# Patient Record
Sex: FEMALE | Race: WHITE | NOT HISPANIC OR LATINO | Employment: OTHER | URBAN - METROPOLITAN AREA
[De-identification: names, ages, dates, MRNs, and addresses within clinical notes are randomized per-mention and may not be internally consistent; named-entity substitution may affect disease eponyms.]

---

## 2017-01-19 RX ORDER — MONTELUKAST SODIUM 10 MG/1
TABLET ORAL
Qty: 30 TABLET | Refills: 0 | Status: SHIPPED | OUTPATIENT
Start: 2017-01-19 | End: 2017-03-07 | Stop reason: SDUPTHER

## 2017-01-19 RX ORDER — ATORVASTATIN CALCIUM 80 MG/1
TABLET, FILM COATED ORAL
Qty: 30 TABLET | Refills: 0 | Status: SHIPPED | OUTPATIENT
Start: 2017-01-19 | End: 2017-03-07 | Stop reason: SDUPTHER

## 2017-01-19 RX ORDER — LEVOTHYROXINE SODIUM 0.05 MG/1
TABLET ORAL
Qty: 30 TABLET | Refills: 0 | Status: SHIPPED | OUTPATIENT
Start: 2017-01-19 | End: 2017-03-07 | Stop reason: SDUPTHER

## 2017-01-23 RX ORDER — LISINOPRIL 20 MG/1
TABLET ORAL
Qty: 90 TABLET | Refills: 0 | OUTPATIENT
Start: 2017-01-23

## 2017-02-21 RX ORDER — LEVOTHYROXINE SODIUM 0.05 MG/1
TABLET ORAL
Qty: 30 TABLET | Refills: 0 | OUTPATIENT
Start: 2017-02-21

## 2017-02-21 RX ORDER — ATORVASTATIN CALCIUM 80 MG/1
TABLET, FILM COATED ORAL
Qty: 30 TABLET | Refills: 0 | OUTPATIENT
Start: 2017-02-21

## 2017-02-21 RX ORDER — MONTELUKAST SODIUM 10 MG/1
TABLET ORAL
Qty: 30 TABLET | Refills: 0 | OUTPATIENT
Start: 2017-02-21

## 2017-03-07 ENCOUNTER — OFFICE VISIT (OUTPATIENT)
Dept: FAMILY MEDICINE CLINIC | Facility: CLINIC | Age: 77
End: 2017-03-07

## 2017-03-07 VITALS
HEART RATE: 77 BPM | DIASTOLIC BLOOD PRESSURE: 60 MMHG | BODY MASS INDEX: 25.58 KG/M2 | OXYGEN SATURATION: 95 % | TEMPERATURE: 98.5 F | HEIGHT: 67 IN | SYSTOLIC BLOOD PRESSURE: 120 MMHG | WEIGHT: 163 LBS

## 2017-03-07 DIAGNOSIS — Z78.0 POSTMENOPAUSAL: ICD-10-CM

## 2017-03-07 DIAGNOSIS — E55.9 VITAMIN D DEFICIENCY: ICD-10-CM

## 2017-03-07 DIAGNOSIS — M19.90 ARTHRITIS: ICD-10-CM

## 2017-03-07 DIAGNOSIS — I10 ESSENTIAL HYPERTENSION: Primary | ICD-10-CM

## 2017-03-07 DIAGNOSIS — E78.00 HYPERCHOLESTEROLEMIA: ICD-10-CM

## 2017-03-07 DIAGNOSIS — J30.9 ATOPIC RHINITIS: ICD-10-CM

## 2017-03-07 PROCEDURE — 99214 OFFICE O/P EST MOD 30 MIN: CPT | Performed by: FAMILY MEDICINE

## 2017-03-07 RX ORDER — LISINOPRIL 20 MG/1
20 TABLET ORAL DAILY
Qty: 90 TABLET | Refills: 1 | Status: SHIPPED | OUTPATIENT
Start: 2017-03-07 | End: 2017-08-17 | Stop reason: SDUPTHER

## 2017-03-07 RX ORDER — LEVOTHYROXINE SODIUM 0.05 MG/1
50 TABLET ORAL DAILY
Qty: 90 TABLET | Refills: 1 | Status: SHIPPED | OUTPATIENT
Start: 2017-03-07 | End: 2017-08-17 | Stop reason: SDUPTHER

## 2017-03-07 RX ORDER — MONTELUKAST SODIUM 10 MG/1
10 TABLET ORAL NIGHTLY
Qty: 90 TABLET | Refills: 1 | Status: SHIPPED | OUTPATIENT
Start: 2017-03-07 | End: 2017-08-17 | Stop reason: SDUPTHER

## 2017-03-07 RX ORDER — ATORVASTATIN CALCIUM 80 MG/1
80 TABLET, FILM COATED ORAL DAILY
Qty: 90 TABLET | Refills: 1 | Status: SHIPPED | OUTPATIENT
Start: 2017-03-07 | End: 2017-08-17 | Stop reason: SDUPTHER

## 2017-03-07 RX ORDER — AZELASTINE HCL 205.5 UG/1
2 SPRAY NASAL 2 TIMES DAILY
Qty: 30 ML | Refills: 5 | Status: SHIPPED | OUTPATIENT
Start: 2017-03-07 | End: 2017-10-09 | Stop reason: SDUPTHER

## 2017-03-07 RX ORDER — FLUTICASONE PROPIONATE 50 MCG
2 SPRAY, SUSPENSION (ML) NASAL DAILY
Qty: 3 EACH | Refills: 3 | Status: SHIPPED | OUTPATIENT
Start: 2017-03-07 | End: 2017-10-09 | Stop reason: SDUPTHER

## 2017-03-07 NOTE — PROGRESS NOTES
Subjective   Joanna Ferris is a 76 y.o. female.   Chief Complaint   Patient presents with   • Hypertension   • Hyperlipidemia   • Allergic Reaction   • Arthritis       History of Present Illness     #1 hypertension- patient is on lisinopril 20 mg a day. She takes it everyday. She reports no side effects. No cough. She adds liitle salt to her diet. She exercises daily for 30-60 minutes. She rides a bike, she does weights and stretching.  She walks her dog twice a day.  She denies chest pain, shortness of breath, LE edema, dizziness, palpitations.      #2 hypercholesterolemia- patient is Lipitor 80 mg a day. She takes it everyday. No muscle aches, no muscle cramps.     #3 allergic rhinitis- patient is on Flonase, Singulair and normal saline drops.  Sometimes she has to use also Astepro.  Symptoms are controlled.  No side effects.  No nosebleeds.     #4 hypothyroidism-on levothyroxine. She takes 50 µg a day. She takes it on empty stomach and does not eat for at least an hour after taking it. TSH 7/216 at 2.9.    #5 arthritis-it is localized in hands and knees.  Patient is under care off rheumatologist.  She is on Cymbalta 60 mg a day.  It controls her symptoms.  It was started in 2015.  She tried prior to that other medications but none of them worked as well as Cymbalta.  Her rheumatologist suggested that she will continue to follow-up with me and see her only on as-needed basis.  Patient does not need refills at this time.    The following portions of the patient's history were reviewed and updated as appropriate: allergies, current medications, past family history, past medical history, past social history, past surgical history and problem list.    Review of Systems   Constitutional: Negative.    HENT: Negative.    Respiratory: Negative.    Cardiovascular: Negative.    Psychiatric/Behavioral: Negative.          Objective   Wt Readings from Last 3 Encounters:   03/07/17 163 lb (73.9 kg)   07/25/16 164 lb (74.4 kg)    12/15/15 161 lb (73 kg)      Vitals:    03/07/17 1216   BP: 120/60   Pulse: 77   Temp: 98.5 °F (36.9 °C)   SpO2: 95%     Temp Readings from Last 3 Encounters:   03/07/17 98.5 °F (36.9 °C)   07/25/16 98.4 °F (36.9 °C)   12/15/15 98.2 °F (36.8 °C)     BP Readings from Last 3 Encounters:   03/07/17 120/60   07/25/16 112/60   12/15/15 120/70     Pulse Readings from Last 3 Encounters:   03/07/17 77   07/25/16 82   12/15/15 76       Physical Exam   Constitutional: She is oriented to person, place, and time. She appears well-developed and well-nourished.   HENT:   Head: Normocephalic and atraumatic.   Right Ear: Tympanic membrane, external ear and ear canal normal.   Left Ear: Tympanic membrane, external ear and ear canal normal.   Nose: Nose normal.   Mouth/Throat: Oropharynx is clear and moist and mucous membranes are normal.   Neck: Neck supple. Carotid bruit is not present. No thyromegaly present.   Cardiovascular: Normal rate, regular rhythm and normal heart sounds.    Pulmonary/Chest: Effort normal and breath sounds normal.   Neurological: She is alert and oriented to person, place, and time.   Skin: Skin is warm, dry and intact.   Psychiatric: She has a normal mood and affect. Her behavior is normal.       Assessment/Plan   Joanna was seen today for hypertension, hyperlipidemia, allergic reaction and arthritis.    Diagnoses and all orders for this visit:    Essential hypertension  -     Basic Metabolic Panel  -     CBC (No Diff); Future  -     Comprehensive Metabolic Panel; Future  -     Lipid Panel With LDL / HDL Ratio; Future  -     Thyroid Cascade Profile; Future  -     Vitamin D 25 Hydroxy; Future    Hypercholesterolemia  -     CBC (No Diff); Future  -     Comprehensive Metabolic Panel; Future  -     Lipid Panel With LDL / HDL Ratio; Future  -     Thyroid Cascade Profile; Future  -     Vitamin D 25 Hydroxy; Future    Atopic rhinitis  -     CBC (No Diff); Future  -     Comprehensive Metabolic Panel; Future  -      Lipid Panel With LDL / HDL Ratio; Future  -     Thyroid Cascade Profile; Future  -     Vitamin D 25 Hydroxy; Future    Arthritis  -     CBC (No Diff); Future  -     Comprehensive Metabolic Panel; Future  -     Lipid Panel With LDL / HDL Ratio; Future  -     Thyroid Cascade Profile; Future  -     Vitamin D 25 Hydroxy; Future    Postmenopausal  -     DEXA Bone Density Axial; Future  -     CBC (No Diff); Future  -     Comprehensive Metabolic Panel; Future  -     Lipid Panel With LDL / HDL Ratio; Future  -     Thyroid Cascade Profile; Future  -     Vitamin D 25 Hydroxy; Future    Vitamin D deficiency  -     Vitamin D 25 Hydroxy; Future    Other orders  -     montelukast (SINGULAIR) 10 MG tablet; Take 1 tablet by mouth Every Night.  -     lisinopril (PRINIVIL,ZESTRIL) 20 MG tablet; Take 1 tablet by mouth Daily.  -     levothyroxine (SYNTHROID, LEVOTHROID) 50 MCG tablet; Take 1 tablet by mouth Daily.  -     fluticasone (FLONASE) 50 MCG/ACT nasal spray; 2 sprays into each nostril Daily.  -     azelastine (ASTEPRO) 0.15 % solution nasal spray; 2 sprays into each nostril 2 (Two) Times a Day.  -     atorvastatin (LIPITOR) 80 MG tablet; Take 1 tablet by mouth Daily.        #1 HTN- controlled.  Continue current treatment.  Check BMP.  Great job with exercise.  Follow-up in 6 months.      #2 hyperlipidemia-continue current treatment.  Follow-up in 6 months.      #3 allergic rhinitis-controlled.  Continue same.  Follow-up in 6 months.    #4 hypothyroidism-continue current treatment.  Will need labs in 6 months.    #5 arthritis-controlled.  No RF needed at this time.    Bone density was done 3 years ago.  We are ordering it.

## 2017-03-08 LAB
BUN SERPL-MCNC: 21 MG/DL (ref 8–27)
BUN/CREAT SERPL: 20 (ref 11–26)
CALCIUM SERPL-MCNC: 9.8 MG/DL (ref 8.7–10.3)
CHLORIDE SERPL-SCNC: 101 MMOL/L (ref 96–106)
CO2 SERPL-SCNC: 24 MMOL/L (ref 18–29)
CREAT SERPL-MCNC: 1.03 MG/DL (ref 0.57–1)
GLUCOSE SERPL-MCNC: 99 MG/DL (ref 65–99)
INTERPRETATION: NORMAL
Lab: NORMAL
POTASSIUM SERPL-SCNC: 4.9 MMOL/L (ref 3.5–5.2)
SODIUM SERPL-SCNC: 143 MMOL/L (ref 134–144)

## 2017-03-20 ENCOUNTER — HOSPITAL ENCOUNTER (OUTPATIENT)
Dept: BONE DENSITY | Facility: HOSPITAL | Age: 77
Discharge: HOME | End: 2017-03-20
Admitting: FAMILY MEDICINE

## 2017-03-20 DIAGNOSIS — Z78.0 POSTMENOPAUSAL: ICD-10-CM

## 2017-03-20 PROCEDURE — 77080 DXA BONE DENSITY AXIAL: CPT

## 2017-08-18 RX ORDER — ATORVASTATIN CALCIUM 80 MG/1
TABLET, FILM COATED ORAL
Qty: 30 TABLET | Refills: 0 | Status: SHIPPED | OUTPATIENT
Start: 2017-08-18 | End: 2017-10-09 | Stop reason: SDUPTHER

## 2017-08-18 RX ORDER — LISINOPRIL 20 MG/1
TABLET ORAL
Qty: 30 TABLET | Refills: 0 | Status: SHIPPED | OUTPATIENT
Start: 2017-08-18 | End: 2017-10-04 | Stop reason: SDUPTHER

## 2017-08-18 RX ORDER — FLUTICASONE PROPIONATE 50 MCG
SPRAY, SUSPENSION (ML) NASAL
Qty: 48 ML | Refills: 0 | Status: SHIPPED | OUTPATIENT
Start: 2017-08-18 | End: 2017-10-09 | Stop reason: SDUPTHER

## 2017-08-18 RX ORDER — AZELASTINE HCL 205.5 UG/1
SPRAY NASAL
Qty: 30 ML | Refills: 0 | Status: SHIPPED | OUTPATIENT
Start: 2017-08-18 | End: 2017-10-09 | Stop reason: SDUPTHER

## 2017-08-18 RX ORDER — LEVOTHYROXINE SODIUM 0.05 MG/1
TABLET ORAL
Qty: 30 TABLET | Refills: 0 | Status: SHIPPED | OUTPATIENT
Start: 2017-08-18 | End: 2017-10-09 | Stop reason: SDUPTHER

## 2017-08-18 RX ORDER — MONTELUKAST SODIUM 10 MG/1
TABLET ORAL
Qty: 30 TABLET | Refills: 0 | Status: SHIPPED | OUTPATIENT
Start: 2017-08-18 | End: 2017-10-09 | Stop reason: SDUPTHER

## 2017-09-07 ENCOUNTER — RESULTS ENCOUNTER (OUTPATIENT)
Dept: FAMILY MEDICINE CLINIC | Facility: CLINIC | Age: 77
End: 2017-09-07

## 2017-09-07 DIAGNOSIS — M19.90 ARTHRITIS: ICD-10-CM

## 2017-09-07 DIAGNOSIS — E78.00 HYPERCHOLESTEROLEMIA: ICD-10-CM

## 2017-09-07 DIAGNOSIS — Z78.0 POSTMENOPAUSAL: ICD-10-CM

## 2017-09-07 DIAGNOSIS — J30.9 ATOPIC RHINITIS: ICD-10-CM

## 2017-09-07 DIAGNOSIS — E55.9 VITAMIN D DEFICIENCY: ICD-10-CM

## 2017-09-07 DIAGNOSIS — I10 ESSENTIAL HYPERTENSION: ICD-10-CM

## 2017-10-04 RX ORDER — LISINOPRIL 20 MG/1
TABLET ORAL
Qty: 30 TABLET | Refills: 0 | Status: SHIPPED | OUTPATIENT
Start: 2017-10-04 | End: 2017-10-09 | Stop reason: SDUPTHER

## 2017-10-09 ENCOUNTER — OFFICE VISIT (OUTPATIENT)
Dept: INTERNAL MEDICINE | Facility: CLINIC | Age: 77
End: 2017-10-09

## 2017-10-09 VITALS
OXYGEN SATURATION: 98 % | DIASTOLIC BLOOD PRESSURE: 70 MMHG | BODY MASS INDEX: 25.43 KG/M2 | HEIGHT: 67 IN | WEIGHT: 162 LBS | SYSTOLIC BLOOD PRESSURE: 120 MMHG | TEMPERATURE: 98.2 F | HEART RATE: 75 BPM

## 2017-10-09 DIAGNOSIS — J30.89 CHRONIC NONSEASONAL ALLERGIC RHINITIS DUE TO POLLEN: ICD-10-CM

## 2017-10-09 DIAGNOSIS — E78.00 HYPERCHOLESTEROLEMIA: ICD-10-CM

## 2017-10-09 DIAGNOSIS — I10 ESSENTIAL HYPERTENSION: Primary | ICD-10-CM

## 2017-10-09 DIAGNOSIS — E03.9 ACQUIRED HYPOTHYROIDISM: ICD-10-CM

## 2017-10-09 PROCEDURE — 99214 OFFICE O/P EST MOD 30 MIN: CPT | Performed by: FAMILY MEDICINE

## 2017-10-09 RX ORDER — AZELASTINE HCL 205.5 UG/1
1 SPRAY NASAL 2 TIMES DAILY
Qty: 30 ML | Refills: 5 | Status: SHIPPED | OUTPATIENT
Start: 2017-10-09 | End: 2020-09-08 | Stop reason: SDUPTHER

## 2017-10-09 RX ORDER — LISINOPRIL 20 MG/1
20 TABLET ORAL DAILY
Qty: 90 TABLET | Refills: 1 | Status: SHIPPED | OUTPATIENT
Start: 2017-10-09 | End: 2018-03-10 | Stop reason: SDUPTHER

## 2017-10-09 RX ORDER — ATORVASTATIN CALCIUM 80 MG/1
80 TABLET, FILM COATED ORAL NIGHTLY
Qty: 90 TABLET | Refills: 1 | Status: SHIPPED | OUTPATIENT
Start: 2017-10-09 | End: 2018-04-05 | Stop reason: SDUPTHER

## 2017-10-09 RX ORDER — MONTELUKAST SODIUM 10 MG/1
10 TABLET ORAL NIGHTLY
Qty: 90 TABLET | Refills: 1 | Status: SHIPPED | OUTPATIENT
Start: 2017-10-09 | End: 2018-04-05 | Stop reason: SDUPTHER

## 2017-10-09 RX ORDER — FLUTICASONE PROPIONATE 50 MCG
2 SPRAY, SUSPENSION (ML) NASAL DAILY
Qty: 48 ML | Refills: 1 | Status: SHIPPED | OUTPATIENT
Start: 2017-10-09 | End: 2018-10-17 | Stop reason: SDUPTHER

## 2017-10-09 RX ORDER — LEVOTHYROXINE SODIUM 0.05 MG/1
50 TABLET ORAL DAILY
Qty: 90 TABLET | Refills: 3 | Status: SHIPPED | OUTPATIENT
Start: 2017-10-09 | End: 2018-09-30 | Stop reason: SDUPTHER

## 2017-10-09 NOTE — PROGRESS NOTES
Subjective   Joanna Ferris is a 76 y.o. female.   Chief Complaint   Patient presents with   • Hypothyroidism   • Allergic Rhinitis   • Hyperlipidemia   • Hypertension       History of Present Illness     #1 hypertension- patient is on lisinopril 20 mg a day. She takes it everyday. She reports no side effects. No cough. She adds liitle salt to her diet. She exercises daily- She walks her dog for 20-30 minutes and she rides a bike for 30 minutes a day.   She denies chest pain, shortness of breath, LE edema, dizziness, palpitations.       #2 hypercholesterolemia- patient is Lipitor 80 mg a day. She takes it everyday. No muscle aches, no muscle cramps.      #3 allergic rhinitis- patient is on Flonase, Singulair and normal saline drops.  Sometimes she has to use also Astepro.  Symptoms are controlled.  No side effects.  No nosebleeds.  She has symptoms all year long.     #4 hypothyroidism-on levothyroxine. She takes 50 µg a day. She takes it on empty stomach and does not eat for at least an hour after taking it.      #5 arthritis/anxiety-it is localized in hands and knees.  Patient is under care off rheumatologist.  She is on Cymbalta 60 mg a day- It helps with anxiety and arthritis.  It controls her symptoms.  It was started in 2015.  She tried prior to that other medications but none of them worked as well as Cymbalta.  Her rheumatologist suggested that she will continue to follow-up with me and see her only on as-needed basis.  Patient does not need refills at this time.    The following portions of the patient's history were reviewed and updated as appropriate: allergies, current medications, past family history, past medical history, past social history, past surgical history and problem list.    Review of Systems   Constitutional: Negative.    Respiratory: Negative.    Cardiovascular: Negative.    Gastrointestinal: Negative.    Psychiatric/Behavioral: Negative.          Objective   Wt Readings from Last 3  Encounters:   10/09/17 162 lb (73.5 kg)   03/07/17 163 lb (73.9 kg)   07/25/16 164 lb (74.4 kg)      Vitals:    10/09/17 1254   BP: 120/70   Pulse: 75   Temp: 98.2 °F (36.8 °C)   SpO2: 98%     Temp Readings from Last 3 Encounters:   10/09/17 98.2 °F (36.8 °C)   03/07/17 98.5 °F (36.9 °C)   07/25/16 98.4 °F (36.9 °C)     BP Readings from Last 3 Encounters:   10/09/17 120/70   03/07/17 120/60   07/25/16 112/60     Pulse Readings from Last 3 Encounters:   10/09/17 75   03/07/17 77   07/25/16 82       Physical Exam   Constitutional: She is oriented to person, place, and time. She appears well-developed and well-nourished.   HENT:   Head: Normocephalic and atraumatic.   Neck: Neck supple. Carotid bruit is not present. No thyromegaly present.   Cardiovascular: Normal rate, regular rhythm and normal heart sounds.    Pulmonary/Chest: Effort normal and breath sounds normal.   Neurological: She is alert and oriented to person, place, and time.   Skin: Skin is warm, dry and intact.   Psychiatric: She has a normal mood and affect. Her behavior is normal.       Assessment/Plan   Joanna was seen today for hypothyroidism, allergic rhinitis, hyperlipidemia and hypertension.    Diagnoses and all orders for this visit:    Essential hypertension    Hypercholesterolemia    Chronic nonseasonal allergic rhinitis due to pollen    Acquired hypothyroidism    Other orders  -     montelukast (SINGULAIR) 10 MG tablet; Take 1 tablet by mouth Every Night.  -     lisinopril (PRINIVIL,ZESTRIL) 20 MG tablet; Take 1 tablet by mouth Daily.  -     levothyroxine (SYNTHROID, LEVOTHROID) 50 MCG tablet; Take 1 tablet by mouth Daily.  -     fluticasone (FLONASE) 50 MCG/ACT nasal spray; 2 sprays into each nostril Daily.  -     azelastine (ASTEPRO) 0.15 % solution nasal spray; 1 spray into each nostril 2 (Two) Times a Day.  -     atorvastatin (LIPITOR) 80 MG tablet; Take 1 tablet by mouth Every Night.        #1 hypertension-controlled.  Continue current  treatment.  Follow-up in 6 months.    #2 hyperlipidemia-check labs.  Continue current treatment.  Follow-up in 6 months.    #3 allergic rhinitis-controlled.  Continue same.  Follow-up in 6-12 months.    #4 hypothyroidism-check labs today.  Follow-up in 12 months.      #5 anxiety/osteoarthritis-no prescription needed at this time.  Follow-up in 6 months.  If patient needs refill sooner she will call us.

## 2017-10-10 LAB
25(OH)D3+25(OH)D2 SERPL-MCNC: 47.5 NG/ML (ref 30–100)
ALBUMIN SERPL-MCNC: 4.7 G/DL (ref 3.5–5.2)
ALBUMIN/GLOB SERPL: 2 G/DL
ALP SERPL-CCNC: 72 U/L (ref 39–117)
ALT SERPL-CCNC: 23 U/L (ref 1–33)
AST SERPL-CCNC: 25 U/L (ref 1–32)
BILIRUB SERPL-MCNC: 0.7 MG/DL (ref 0.1–1.2)
BUN SERPL-MCNC: 20 MG/DL (ref 8–23)
BUN/CREAT SERPL: 16.9 (ref 7–25)
CALCIUM SERPL-MCNC: 10.4 MG/DL (ref 8.6–10.5)
CHLORIDE SERPL-SCNC: 99 MMOL/L (ref 98–107)
CHOLEST SERPL-MCNC: 219 MG/DL (ref 0–200)
CO2 SERPL-SCNC: 24.9 MMOL/L (ref 22–29)
CREAT SERPL-MCNC: 1.18 MG/DL (ref 0.57–1)
ERYTHROCYTE [DISTWIDTH] IN BLOOD BY AUTOMATED COUNT: 13.7 % (ref 11.7–13)
GLOBULIN SER CALC-MCNC: 2.4 GM/DL
GLUCOSE SERPL-MCNC: 99 MG/DL (ref 65–99)
HCT VFR BLD AUTO: 45.5 % (ref 35.6–45.5)
HDLC SERPL-MCNC: 85 MG/DL (ref 40–60)
HGB BLD-MCNC: 15.2 G/DL (ref 11.9–15.5)
LDLC SERPL CALC-MCNC: 114 MG/DL (ref 0–100)
LDLC/HDLC SERPL: 1.34 {RATIO}
MCH RBC QN AUTO: 32.5 PG (ref 26.9–32)
MCHC RBC AUTO-ENTMCNC: 33.4 G/DL (ref 32.4–36.3)
MCV RBC AUTO: 97.2 FL (ref 80.5–98.2)
PLATELET # BLD AUTO: 279 10*3/MM3 (ref 140–500)
POTASSIUM SERPL-SCNC: 4.7 MMOL/L (ref 3.5–5.2)
PROT SERPL-MCNC: 7.1 G/DL (ref 6–8.5)
RBC # BLD AUTO: 4.68 10*6/MM3 (ref 3.9–5.2)
SODIUM SERPL-SCNC: 141 MMOL/L (ref 136–145)
TRIGL SERPL-MCNC: 99 MG/DL (ref 0–150)
TSH SERPL DL<=0.005 MIU/L-ACNC: 3.03 UIU/ML (ref 0.45–4.5)
VLDLC SERPL CALC-MCNC: 19.8 MG/DL (ref 5–40)
WBC # BLD AUTO: 8.58 10*3/MM3 (ref 4.5–10.7)

## 2017-11-09 ENCOUNTER — OFFICE VISIT (OUTPATIENT)
Dept: OBSTETRICS AND GYNECOLOGY | Facility: CLINIC | Age: 77
End: 2017-11-09

## 2017-11-09 ENCOUNTER — PROCEDURE VISIT (OUTPATIENT)
Dept: OBSTETRICS AND GYNECOLOGY | Facility: CLINIC | Age: 77
End: 2017-11-09

## 2017-11-09 VITALS
HEIGHT: 67 IN | WEIGHT: 159 LBS | DIASTOLIC BLOOD PRESSURE: 80 MMHG | BODY MASS INDEX: 24.96 KG/M2 | SYSTOLIC BLOOD PRESSURE: 140 MMHG

## 2017-11-09 DIAGNOSIS — D25.9 UTERINE LEIOMYOMA, UNSPECIFIED LOCATION: Primary | ICD-10-CM

## 2017-11-09 DIAGNOSIS — R14.0 ABDOMINAL BLOATING: Primary | ICD-10-CM

## 2017-11-09 DIAGNOSIS — R14.0 ABDOMINAL BLOATING: ICD-10-CM

## 2017-11-09 PROCEDURE — 76830 TRANSVAGINAL US NON-OB: CPT | Performed by: NURSE PRACTITIONER

## 2017-11-09 PROCEDURE — 99213 OFFICE O/P EST LOW 20 MIN: CPT | Performed by: NURSE PRACTITIONER

## 2017-11-09 NOTE — PROGRESS NOTES
Joanna Ferris is a 76 y.o. female.   Chief Complaint   Patient presents with   • Follow-up     Patient is here for fibriod recheck.      HPI:pt c/o abdominal bloating  And would like her fibroid rechecked    The following portions of the patient's history were reviewed and updated as appropriate: allergies, current medications, past family history, past medical history, past social history, past surgical history and problem list.    Review of Systems  Review of Systems   Constitutional:        Abdominal bloating   Genitourinary: Negative.    Allergic/Immunologic: Negative.    Psychiatric/Behavioral: Negative.        Objective   Physical Exam   Constitutional: She is oriented to person, place, and time. She appears well-developed and well-nourished.   Neurological: She is alert and oriented to person, place, and time.   Skin: Skin is warm and dry.   Psychiatric: She has a normal mood and affect. Her behavior is normal.   Nursing note and vitals reviewed.      Assessment/Plan   There are no Patient Instructions on file for this visit.    Joanna was seen today for follow-up.    Diagnoses and all orders for this visit:    Abdominal bloating        No Follow-up on file.

## 2017-11-09 NOTE — PATIENT INSTRUCTIONS
U/s showed calcified fibroid , ovaries wnl.. Total time 15 min  Face to face reviewing the u/s 10 min

## 2018-03-12 RX ORDER — LISINOPRIL 20 MG/1
20 TABLET ORAL DAILY
Qty: 90 TABLET | Refills: 0 | Status: SHIPPED | OUTPATIENT
Start: 2018-03-12 | End: 2018-04-09 | Stop reason: SDUPTHER

## 2018-03-16 RX ORDER — FLUTICASONE PROPIONATE 50 MCG
SPRAY, SUSPENSION (ML) NASAL
Qty: 48 ML | Refills: 0 | Status: SHIPPED | OUTPATIENT
Start: 2018-03-16 | End: 2018-04-09 | Stop reason: SDUPTHER

## 2018-04-05 RX ORDER — MONTELUKAST SODIUM 10 MG/1
10 TABLET ORAL NIGHTLY
Qty: 90 TABLET | Refills: 0 | Status: SHIPPED | OUTPATIENT
Start: 2018-04-05 | End: 2018-07-02 | Stop reason: SDUPTHER

## 2018-04-05 RX ORDER — ATORVASTATIN CALCIUM 80 MG/1
80 TABLET, FILM COATED ORAL NIGHTLY
Qty: 90 TABLET | Refills: 0 | Status: SHIPPED | OUTPATIENT
Start: 2018-04-05 | End: 2018-04-09 | Stop reason: SDUPTHER

## 2018-04-09 ENCOUNTER — OFFICE VISIT (OUTPATIENT)
Dept: INTERNAL MEDICINE | Facility: CLINIC | Age: 78
End: 2018-04-09

## 2018-04-09 VITALS
TEMPERATURE: 98.2 F | DIASTOLIC BLOOD PRESSURE: 62 MMHG | OXYGEN SATURATION: 98 % | HEIGHT: 66 IN | SYSTOLIC BLOOD PRESSURE: 104 MMHG | HEART RATE: 78 BPM | WEIGHT: 155 LBS | BODY MASS INDEX: 24.91 KG/M2

## 2018-04-09 DIAGNOSIS — I10 ESSENTIAL HYPERTENSION: ICD-10-CM

## 2018-04-09 DIAGNOSIS — N18.30 CKD (CHRONIC KIDNEY DISEASE) STAGE 3, GFR 30-59 ML/MIN (HCC): ICD-10-CM

## 2018-04-09 DIAGNOSIS — I10 ESSENTIAL HYPERTENSION: Primary | ICD-10-CM

## 2018-04-09 DIAGNOSIS — E78.00 HYPERCHOLESTEROLEMIA: ICD-10-CM

## 2018-04-09 DIAGNOSIS — M19.90 ARTHRITIS: ICD-10-CM

## 2018-04-09 DIAGNOSIS — F41.1 GENERALIZED ANXIETY DISORDER: ICD-10-CM

## 2018-04-09 PROCEDURE — 99214 OFFICE O/P EST MOD 30 MIN: CPT | Performed by: FAMILY MEDICINE

## 2018-04-09 RX ORDER — LISINOPRIL 10 MG/1
10 TABLET ORAL DAILY
Qty: 90 TABLET | Refills: 1 | Status: SHIPPED | OUTPATIENT
Start: 2018-04-09 | End: 2018-12-18 | Stop reason: SDUPTHER

## 2018-04-09 RX ORDER — ATORVASTATIN CALCIUM 80 MG/1
80 TABLET, FILM COATED ORAL NIGHTLY
Qty: 90 TABLET | Refills: 1 | Status: SHIPPED | OUTPATIENT
Start: 2018-04-09 | End: 2018-12-19 | Stop reason: SDUPTHER

## 2018-04-09 NOTE — PROGRESS NOTES
Subjective   Joanna Ferris is a 77 y.o. female.   Chief Complaint   Patient presents with   • Hypotension   • Dizziness   • Hyperlipidemia   • Chronic Kidney Disease       History of Present Illness     #1 hypertension- patient is on lisinopril 20 mg a day. She takes it everyday. Last 2-3 months she noticed dizziness when she stands up.  She improved her diet and she lost 8 pounds.  She adds liitle salt to her diet. She exercises daily- She walks her dog for 20-30 minutes. She denies chest pain, shortness of breath, LE edema, palpitations.       #2 hypercholesterolemia- patient is Lipitor 80 mg a day. She takes it everyday. No muscle aches, no muscle cramps.      #3 arthritis/anxiety-it is localized in hands and knees.  Patient is under care off rheumatologist.  She is on Cymbalta 60 mg a day- It helps with anxiety and arthritis.  It controls her symptoms.  It was started in 2015.  She tried prior to that other medications but none of them worked as well as Cymbalta.  Her rheumatologist suggested that she will continue to follow-up with me and see her only on as-needed basis.  Patient does not need refills at this time.    #4 chronic kidney failure-patient does not use any NSAIDs or PPIs.  No urinary symptoms.       The following portions of the patient's history were reviewed and updated as appropriate: allergies, current medications, past family history, past medical history, past social history, past surgical history and problem list.    Review of Systems   Constitutional: Negative.    Respiratory: Negative.    Cardiovascular: Negative.    Neurological: Positive for light-headedness.         Objective   Wt Readings from Last 3 Encounters:   04/09/18 70.3 kg (155 lb)   11/09/17 72.1 kg (159 lb)   10/09/17 73.5 kg (162 lb)      Vitals:    04/09/18 1417   BP: 104/62   Pulse: 78   Temp: 98.2 °F (36.8 °C)   SpO2: 98%     Temp Readings from Last 3 Encounters:   04/09/18 98.2 °F (36.8 °C)   10/09/17 98.2 °F (36.8 °C)    03/07/17 98.5 °F (36.9 °C)     BP Readings from Last 3 Encounters:   04/09/18 104/62   11/09/17 140/80   10/09/17 120/70     Pulse Readings from Last 3 Encounters:   04/09/18 78   10/09/17 75   03/07/17 77       Physical Exam   Constitutional: She is oriented to person, place, and time. She appears well-developed and well-nourished.   HENT:   Head: Normocephalic and atraumatic.   Neck: Neck supple. Carotid bruit is not present. No thyromegaly present.   Cardiovascular: Normal rate, regular rhythm and normal heart sounds.    Pulmonary/Chest: Effort normal and breath sounds normal.   Neurological: She is alert and oriented to person, place, and time.   Skin: Skin is warm, dry and intact.   Psychiatric: She has a normal mood and affect. Her behavior is normal.       Assessment/Plan   Joanna was seen today for hypotension, dizziness, hyperlipidemia and chronic kidney disease.    Diagnoses and all orders for this visit:    Essential hypertension  -     Basic Metabolic Panel; Future    Hypercholesterolemia    CKD (chronic kidney disease) stage 3, GFR 30-59 ml/min    Arthritis    Generalized anxiety disorder    Other orders  -     lisinopril (PRINIVIL,ZESTRIL) 10 MG tablet; Take 1 tablet by mouth Daily.  -     atorvastatin (LIPITOR) 80 MG tablet; Take 1 tablet by mouth Every Night.        #1 HTN-symptoms of low blood pressure.  Can be secondary to weight loss.  We are decreasing dose of lisinopril to 10 mg a day.  Follow-up in one month or sooner if problems.    #2 hyperlipidemia-continue current treatment.  Follow-up in 6 months.    #3 chronic kidney failure-check labs.  Continue to avoid NSAIDs and PPIs.  Follow-up in 6 months.    #4 arthritis/anxiety-patient does not need refills of Cymbalta at this time.  She will call us when it's needed.  Follow-up in 6 months.

## 2018-04-10 LAB
BUN SERPL-MCNC: 22 MG/DL (ref 8–23)
BUN/CREAT SERPL: 20.4 (ref 7–25)
CALCIUM SERPL-MCNC: 10.2 MG/DL (ref 8.6–10.5)
CHLORIDE SERPL-SCNC: 100 MMOL/L (ref 98–107)
CO2 SERPL-SCNC: 27.7 MMOL/L (ref 22–29)
CREAT SERPL-MCNC: 1.08 MG/DL (ref 0.57–1)
GFR SERPLBLD CREATININE-BSD FMLA CKD-EPI: 49 ML/MIN/1.73
GFR SERPLBLD CREATININE-BSD FMLA CKD-EPI: 60 ML/MIN/1.73
GLUCOSE SERPL-MCNC: 90 MG/DL (ref 65–99)
POTASSIUM SERPL-SCNC: 4.7 MMOL/L (ref 3.5–5.2)
SODIUM SERPL-SCNC: 140 MMOL/L (ref 136–145)

## 2018-05-21 ENCOUNTER — OFFICE VISIT (OUTPATIENT)
Dept: INTERNAL MEDICINE | Facility: CLINIC | Age: 78
End: 2018-05-21

## 2018-05-21 VITALS
SYSTOLIC BLOOD PRESSURE: 118 MMHG | HEIGHT: 66 IN | DIASTOLIC BLOOD PRESSURE: 68 MMHG | TEMPERATURE: 98 F | OXYGEN SATURATION: 98 % | BODY MASS INDEX: 24.75 KG/M2 | HEART RATE: 75 BPM | WEIGHT: 154 LBS

## 2018-05-21 DIAGNOSIS — I10 ESSENTIAL HYPERTENSION: Primary | ICD-10-CM

## 2018-05-21 PROCEDURE — 99213 OFFICE O/P EST LOW 20 MIN: CPT | Performed by: FAMILY MEDICINE

## 2018-05-21 NOTE — PROGRESS NOTES
Subjective   Joanna Ferris is a 77 y.o. female.   Chief Complaint   Patient presents with   • Hypertension       History of Present Illness     #1 HTN- at last visit we reduced dose of lisinopril to 10 mg a day due to dizziness.  Patient did not have any problems with dizziness since then.  Blood pressure is 118/68.  She has no chest pain, no shortness of breath, no palpitations.  She feels good.  She exercises regularly.    The following portions of the patient's history were reviewed and updated as appropriate: allergies, current medications, past medical history, past social history and problem list.    Review of Systems   Constitutional: Negative.    Respiratory: Negative.    Cardiovascular: Negative.    Neurological: Negative for dizziness.         Objective   Wt Readings from Last 3 Encounters:   05/21/18 69.9 kg (154 lb)   04/09/18 70.3 kg (155 lb)   11/09/17 72.1 kg (159 lb)      Vitals:    05/21/18 1246   BP: 118/68   Pulse: 75   Temp: 98 °F (36.7 °C)   SpO2: 98%     Temp Readings from Last 3 Encounters:   05/21/18 98 °F (36.7 °C)   04/09/18 98.2 °F (36.8 °C)   10/09/17 98.2 °F (36.8 °C)     BP Readings from Last 3 Encounters:   05/21/18 118/68   04/09/18 104/62   11/09/17 140/80     Pulse Readings from Last 3 Encounters:   05/21/18 75   04/09/18 78   10/09/17 75       Physical Exam   Constitutional: She is oriented to person, place, and time. She appears well-developed and well-nourished.   HENT:   Head: Normocephalic and atraumatic.   Neck: Neck supple. Carotid bruit is not present. No thyromegaly present.   Cardiovascular: Normal rate, regular rhythm and normal heart sounds.    Pulmonary/Chest: Effort normal and breath sounds normal.   Neurological: She is alert and oriented to person, place, and time.   Skin: Skin is warm, dry and intact.   Psychiatric: She has a normal mood and affect. Her behavior is normal.       Assessment/Plan   Joanna was seen today for hypertension.    Diagnoses and all orders  for this visit:    Essential hypertension        #1 hypertension-controlled.  Continue current treatment.  Follow-up in 6 months.

## 2018-06-20 RX ORDER — DULOXETIN HYDROCHLORIDE 60 MG/1
60 CAPSULE, DELAYED RELEASE ORAL DAILY
Qty: 90 CAPSULE | Refills: 1 | Status: SHIPPED | OUTPATIENT
Start: 2018-06-20 | End: 2018-12-16 | Stop reason: SDUPTHER

## 2018-07-02 RX ORDER — MONTELUKAST SODIUM 10 MG/1
10 TABLET ORAL NIGHTLY
Qty: 90 TABLET | Refills: 0 | Status: SHIPPED | OUTPATIENT
Start: 2018-07-02 | End: 2018-09-30 | Stop reason: SDUPTHER

## 2018-07-02 RX ORDER — ATORVASTATIN CALCIUM 80 MG/1
80 TABLET, FILM COATED ORAL NIGHTLY
Qty: 90 TABLET | Refills: 0 | Status: SHIPPED | OUTPATIENT
Start: 2018-07-02 | End: 2018-11-10 | Stop reason: HOSPADM

## 2018-07-20 RX ORDER — FLUTICASONE PROPIONATE 50 MCG
SPRAY, SUSPENSION (ML) NASAL
Qty: 48 ML | Refills: 0 | Status: SHIPPED | OUTPATIENT
Start: 2018-07-20 | End: 2018-10-17 | Stop reason: SDUPTHER

## 2018-09-13 ENCOUNTER — OFFICE VISIT (OUTPATIENT)
Dept: INTERNAL MEDICINE | Facility: CLINIC | Age: 78
End: 2018-09-13

## 2018-09-13 VITALS
WEIGHT: 156.38 LBS | TEMPERATURE: 97.9 F | HEIGHT: 67 IN | HEART RATE: 76 BPM | SYSTOLIC BLOOD PRESSURE: 122 MMHG | BODY MASS INDEX: 24.54 KG/M2 | OXYGEN SATURATION: 97 % | DIASTOLIC BLOOD PRESSURE: 68 MMHG

## 2018-09-13 DIAGNOSIS — J01.00 ACUTE NON-RECURRENT MAXILLARY SINUSITIS: Primary | ICD-10-CM

## 2018-09-13 DIAGNOSIS — R05.9 COUGH: ICD-10-CM

## 2018-09-13 PROCEDURE — 99213 OFFICE O/P EST LOW 20 MIN: CPT | Performed by: NURSE PRACTITIONER

## 2018-09-13 RX ORDER — BENZONATATE 100 MG/1
100 CAPSULE ORAL 3 TIMES DAILY PRN
Qty: 30 CAPSULE | Refills: 0 | Status: SHIPPED | OUTPATIENT
Start: 2018-09-13 | End: 2019-08-26

## 2018-09-13 RX ORDER — AMOXICILLIN 875 MG/1
875 TABLET, COATED ORAL 2 TIMES DAILY
Qty: 20 TABLET | Refills: 0 | Status: SHIPPED | OUTPATIENT
Start: 2018-09-13 | End: 2018-09-24

## 2018-09-13 NOTE — PROGRESS NOTES
Subjective   Joanna Ferris is a 77 y.o. female. Patient is here today for   Chief Complaint   Patient presents with   • URI     Pt complains of having bilateral ear pain, nasal congestion & productive cough x2 weeks. Pt has been taking Mucinex OTC.    .    History of Present Illness   C/o cough x 2 weeks associated with bilateral ear pain, sore throat, post-nasal drainage, facial pain.   She takes singulair and flonase daily and has tried mucinex. The cough is worse at night.   She has also tried netti-pot and humidifier with little relief.     The following portions of the patient's history were reviewed and updated as appropriate: allergies, current medications, past family history, past medical history, past social history, past surgical history and problem list.    Review of Systems   Constitutional: Positive for fatigue.   HENT: Positive for congestion, postnasal drip, sinus pain, sinus pressure and sore throat.    Respiratory: Positive for cough. Negative for shortness of breath and wheezing.    Cardiovascular: Negative.    Gastrointestinal: Negative.    Psychiatric/Behavioral: Negative.        Objective   Vitals:    09/13/18 1123   BP: 122/68   Pulse: 76   Temp: 97.9 °F (36.6 °C)   SpO2: 97%     Physical Exam   Constitutional: Vital signs are normal. She appears well-developed and well-nourished.   HENT:   Right Ear: Tympanic membrane is erythematous.   Left Ear: Tympanic membrane is erythematous.   Cardiovascular: Normal rate, regular rhythm and normal heart sounds.    Pulmonary/Chest: Effort normal and breath sounds normal.   Skin: Skin is warm, dry and intact.   Psychiatric: She has a normal mood and affect. Her speech is normal and behavior is normal. Thought content normal.   Nursing note and vitals reviewed.      Assessment/Plan   Joanna was seen today for uri.    Diagnoses and all orders for this visit:    Acute non-recurrent maxillary sinusitis  -     amoxicillin (AMOXIL) 875 MG tablet; Take 1 tablet  by mouth 2 (Two) Times a Day.    Cough  -     benzonatate (TESSALON PERLES) 100 MG capsule; Take 1 capsule by mouth 3 (Three) Times a Day As Needed for Cough.

## 2018-09-24 ENCOUNTER — TELEPHONE (OUTPATIENT)
Dept: INTERNAL MEDICINE | Facility: CLINIC | Age: 78
End: 2018-09-24

## 2018-09-24 RX ORDER — AMOXICILLIN AND CLAVULANATE POTASSIUM 875; 125 MG/1; MG/1
1 TABLET, FILM COATED ORAL 2 TIMES DAILY
Qty: 20 TABLET | Refills: 0 | Status: SHIPPED | OUTPATIENT
Start: 2018-09-24 | End: 2018-11-10 | Stop reason: HOSPADM

## 2018-09-24 NOTE — TELEPHONE ENCOUNTER
----- Message from Amanda Garza MA sent at 9/24/2018  1:10 PM EDT -----  Please advise.     ----- Message -----  From: Malou Buckner  Sent: 9/24/2018   1:00 PM  To: Amanda Garza MA    Pt saw Olive on 9/13 for a sinus infection. Was given Amoxicillin and now it has settled in her eye and ear. Need another antibiotic?  Phone: [phone]

## 2018-10-01 RX ORDER — MONTELUKAST SODIUM 10 MG/1
10 TABLET ORAL NIGHTLY
Qty: 90 TABLET | Refills: 0 | Status: SHIPPED | OUTPATIENT
Start: 2018-10-01 | End: 2019-01-20 | Stop reason: SDUPTHER

## 2018-10-01 RX ORDER — LEVOTHYROXINE SODIUM 0.05 MG/1
50 TABLET ORAL DAILY
Qty: 90 TABLET | Refills: 0 | Status: SHIPPED | OUTPATIENT
Start: 2018-10-01 | End: 2019-01-05 | Stop reason: SDUPTHER

## 2018-10-17 RX ORDER — FLUTICASONE PROPIONATE 50 MCG
2 SPRAY, SUSPENSION (ML) NASAL DAILY
Qty: 48 ML | Refills: 1 | Status: SHIPPED | OUTPATIENT
Start: 2018-10-17 | End: 2019-04-19 | Stop reason: SDUPTHER

## 2018-11-09 ENCOUNTER — HOSPITAL ENCOUNTER (OUTPATIENT)
Facility: HOSPITAL | Age: 78
Setting detail: OBSERVATION
Discharge: HOME | End: 2018-11-10
Attending: EMERGENCY MEDICINE | Admitting: EMERGENCY MEDICINE

## 2018-11-09 ENCOUNTER — APPOINTMENT (OUTPATIENT)
Dept: CARDIOLOGY | Facility: HOSPITAL | Age: 78
End: 2018-11-09
Attending: PSYCHIATRY & NEUROLOGY

## 2018-11-09 ENCOUNTER — APPOINTMENT (OUTPATIENT)
Dept: MRI IMAGING | Facility: HOSPITAL | Age: 78
End: 2018-11-09

## 2018-11-09 ENCOUNTER — APPOINTMENT (OUTPATIENT)
Dept: CT IMAGING | Facility: HOSPITAL | Age: 78
End: 2018-11-09

## 2018-11-09 DIAGNOSIS — G45.4 TGA (TRANSIENT GLOBAL AMNESIA): Primary | ICD-10-CM

## 2018-11-09 LAB
ALBUMIN SERPL-MCNC: 4.3 G/DL (ref 3.5–5.2)
ALBUMIN/GLOB SERPL: 1.4 G/DL
ALP SERPL-CCNC: 75 U/L (ref 39–117)
ALT SERPL W P-5'-P-CCNC: 28 U/L (ref 1–33)
ANION GAP SERPL CALCULATED.3IONS-SCNC: 13.3 MMOL/L
AST SERPL-CCNC: 28 U/L (ref 1–32)
BASOPHILS # BLD AUTO: 0.04 10*3/MM3 (ref 0–0.2)
BASOPHILS NFR BLD AUTO: 0.5 % (ref 0–1.5)
BH CV XLRA MEAS LEFT DIST CCA EDV: 19.9 CM/SEC
BH CV XLRA MEAS LEFT DIST CCA PSV: 76.8 CM/SEC
BH CV XLRA MEAS LEFT DIST ICA EDV: -16.5 CM/SEC
BH CV XLRA MEAS LEFT DIST ICA PSV: -57 CM/SEC
BH CV XLRA MEAS LEFT ICA/CCA RATIO: 0.93
BH CV XLRA MEAS LEFT MID ICA EDV: 24 CM/SEC
BH CV XLRA MEAS LEFT MID ICA PSV: 71.5 CM/SEC
BH CV XLRA MEAS LEFT PROX CCA EDV: 12.9 CM/SEC
BH CV XLRA MEAS LEFT PROX CCA PSV: 64.5 CM/SEC
BH CV XLRA MEAS LEFT PROX ECA EDV: -10.6 CM/SEC
BH CV XLRA MEAS LEFT PROX ECA PSV: -85.6 CM/SEC
BH CV XLRA MEAS LEFT PROX ICA EDV: 14.1 CM/SEC
BH CV XLRA MEAS LEFT PROX ICA PSV: 50.7 CM/SEC
BH CV XLRA MEAS LEFT PROX SCLA PSV: 66.4 CM/SEC
BH CV XLRA MEAS LEFT VERTEBRAL A EDV: 8.6 CM/SEC
BH CV XLRA MEAS LEFT VERTEBRAL A PSV: 30.2 CM/SEC
BH CV XLRA MEAS RIGHT DIST CCA EDV: 14.1 CM/SEC
BH CV XLRA MEAS RIGHT DIST CCA PSV: 53.4 CM/SEC
BH CV XLRA MEAS RIGHT DIST ICA EDV: -18.9 CM/SEC
BH CV XLRA MEAS RIGHT DIST ICA PSV: -62.9 CM/SEC
BH CV XLRA MEAS RIGHT ICA RATIO VEL: -37.4 CM/SEC
BH CV XLRA MEAS RIGHT ICA/CCA RATIO: 1.18
BH CV XLRA MEAS RIGHT MID ICA EDV: -17.7 CM/SEC
BH CV XLRA MEAS RIGHT MID ICA PSV: -53 CM/SEC
BH CV XLRA MEAS RIGHT PROX CCA EDV: -13.5 CM/SEC
BH CV XLRA MEAS RIGHT PROX CCA PSV: -59.8 CM/SEC
BH CV XLRA MEAS RIGHT PROX ECA EDV: -8.8 CM/SEC
BH CV XLRA MEAS RIGHT PROX ECA PSV: -71.5 CM/SEC
BH CV XLRA MEAS RIGHT PROX ICA EDV: -11.4 CM/SEC
BH CV XLRA MEAS RIGHT PROX ICA PSV: -38.8 CM/SEC
BH CV XLRA MEAS RIGHT PROX SCLA PSV: -95.6 CM/SEC
BH CV XLRA MEAS RIGHT VERTEBRAL A EDV: 11.8 CM/SEC
BH CV XLRA MEAS RIGHT VERTEBRAL A PSV: 55 CM/SEC
BILIRUB SERPL-MCNC: 0.7 MG/DL (ref 0.1–1.2)
BUN BLD-MCNC: 18 MG/DL (ref 8–23)
BUN/CREAT SERPL: 17.3 (ref 7–25)
CALCIUM SPEC-SCNC: 10.2 MG/DL (ref 8.6–10.5)
CHLORIDE SERPL-SCNC: 101 MMOL/L (ref 98–107)
CO2 SERPL-SCNC: 25.7 MMOL/L (ref 22–29)
CREAT BLD-MCNC: 1.04 MG/DL (ref 0.57–1)
DEPRECATED RDW RBC AUTO: 47.3 FL (ref 37–54)
EOSINOPHIL # BLD AUTO: 0.1 10*3/MM3 (ref 0–0.7)
EOSINOPHIL NFR BLD AUTO: 1.4 % (ref 0.3–6.2)
ERYTHROCYTE [DISTWIDTH] IN BLOOD BY AUTOMATED COUNT: 13.7 % (ref 11.7–13)
GFR SERPL CREATININE-BSD FRML MDRD: 52 ML/MIN/1.73
GLOBULIN UR ELPH-MCNC: 3 GM/DL
GLUCOSE BLD-MCNC: 113 MG/DL (ref 65–99)
GLUCOSE BLDC GLUCOMTR-MCNC: 76 MG/DL (ref 70–130)
GLUCOSE BLDC GLUCOMTR-MCNC: 92 MG/DL (ref 70–130)
HCT VFR BLD AUTO: 45.7 % (ref 35.6–45.5)
HGB BLD-MCNC: 15.4 G/DL (ref 11.9–15.5)
IMM GRANULOCYTES # BLD: 0.01 10*3/MM3 (ref 0–0.03)
IMM GRANULOCYTES NFR BLD: 0.1 % (ref 0–0.5)
LEFT ARM BP: NORMAL MMHG
LYMPHOCYTES # BLD AUTO: 1.7 10*3/MM3 (ref 0.9–4.8)
LYMPHOCYTES NFR BLD AUTO: 23.3 % (ref 19.6–45.3)
MCH RBC QN AUTO: 31.8 PG (ref 26.9–32)
MCHC RBC AUTO-ENTMCNC: 33.7 G/DL (ref 32.4–36.3)
MCV RBC AUTO: 94.4 FL (ref 80.5–98.2)
MONOCYTES # BLD AUTO: 0.77 10*3/MM3 (ref 0.2–1.2)
MONOCYTES NFR BLD AUTO: 10.6 % (ref 5–12)
NEUTROPHILS # BLD AUTO: 4.68 10*3/MM3 (ref 1.9–8.1)
NEUTROPHILS NFR BLD AUTO: 64.2 % (ref 42.7–76)
PLATELET # BLD AUTO: 218 10*3/MM3 (ref 140–500)
PMV BLD AUTO: 10.9 FL (ref 6–12)
POTASSIUM BLD-SCNC: 4.4 MMOL/L (ref 3.5–5.2)
PROT SERPL-MCNC: 7.3 G/DL (ref 6–8.5)
RBC # BLD AUTO: 4.84 10*6/MM3 (ref 3.9–5.2)
RIGHT ARM BP: NORMAL MMHG
SODIUM BLD-SCNC: 140 MMOL/L (ref 136–145)
TROPONIN T SERPL-MCNC: <0.01 NG/ML (ref 0–0.03)
WBC NRBC COR # BLD: 7.29 10*3/MM3 (ref 4.5–10.7)

## 2018-11-09 PROCEDURE — 70450 CT HEAD/BRAIN W/O DYE: CPT

## 2018-11-09 PROCEDURE — 93010 ELECTROCARDIOGRAM REPORT: CPT | Performed by: INTERNAL MEDICINE

## 2018-11-09 PROCEDURE — 0 GADOBENATE DIMEGLUMINE 529 MG/ML SOLUTION: Performed by: HOSPITALIST

## 2018-11-09 PROCEDURE — G0378 HOSPITAL OBSERVATION PER HR: HCPCS

## 2018-11-09 PROCEDURE — 99285 EMERGENCY DEPT VISIT HI MDM: CPT

## 2018-11-09 PROCEDURE — 82962 GLUCOSE BLOOD TEST: CPT

## 2018-11-09 PROCEDURE — 80053 COMPREHEN METABOLIC PANEL: CPT | Performed by: EMERGENCY MEDICINE

## 2018-11-09 PROCEDURE — 70553 MRI BRAIN STEM W/O & W/DYE: CPT

## 2018-11-09 PROCEDURE — 93005 ELECTROCARDIOGRAM TRACING: CPT | Performed by: EMERGENCY MEDICINE

## 2018-11-09 PROCEDURE — 84484 ASSAY OF TROPONIN QUANT: CPT | Performed by: EMERGENCY MEDICINE

## 2018-11-09 PROCEDURE — 85025 COMPLETE CBC W/AUTO DIFF WBC: CPT | Performed by: EMERGENCY MEDICINE

## 2018-11-09 PROCEDURE — A9577 INJ MULTIHANCE: HCPCS | Performed by: HOSPITALIST

## 2018-11-09 PROCEDURE — 93880 EXTRACRANIAL BILAT STUDY: CPT

## 2018-11-09 RX ORDER — HYDROCODONE BITARTRATE AND ACETAMINOPHEN 5; 325 MG/1; MG/1
1 TABLET ORAL ONCE AS NEEDED
Status: COMPLETED | OUTPATIENT
Start: 2018-11-09 | End: 2018-11-09

## 2018-11-09 RX ORDER — ACETAMINOPHEN 500 MG
1000 TABLET ORAL ONCE
Status: COMPLETED | OUTPATIENT
Start: 2018-11-09 | End: 2018-11-09

## 2018-11-09 RX ORDER — ATORVASTATIN CALCIUM 80 MG/1
80 TABLET, FILM COATED ORAL NIGHTLY
Status: DISCONTINUED | OUTPATIENT
Start: 2018-11-09 | End: 2018-11-10 | Stop reason: HOSPADM

## 2018-11-09 RX ORDER — FLUTICASONE PROPIONATE 50 MCG
2 SPRAY, SUSPENSION (ML) NASAL DAILY
Status: DISCONTINUED | OUTPATIENT
Start: 2018-11-09 | End: 2018-11-10 | Stop reason: HOSPADM

## 2018-11-09 RX ORDER — LEVOTHYROXINE SODIUM 0.05 MG/1
50 TABLET ORAL DAILY
Status: DISCONTINUED | OUTPATIENT
Start: 2018-11-09 | End: 2018-11-10 | Stop reason: HOSPADM

## 2018-11-09 RX ORDER — ONDANSETRON 2 MG/ML
4 INJECTION INTRAMUSCULAR; INTRAVENOUS EVERY 6 HOURS PRN
Status: DISCONTINUED | OUTPATIENT
Start: 2018-11-09 | End: 2018-11-10 | Stop reason: HOSPADM

## 2018-11-09 RX ORDER — GLUCOSA SU 2KCL/CHONDROITIN SU 500-400 MG
1 CAPSULE ORAL DAILY
COMMUNITY

## 2018-11-09 RX ORDER — SODIUM CHLORIDE 0.9 % (FLUSH) 0.9 %
3 SYRINGE (ML) INJECTION EVERY 12 HOURS SCHEDULED
Status: DISCONTINUED | OUTPATIENT
Start: 2018-11-09 | End: 2018-11-10 | Stop reason: HOSPADM

## 2018-11-09 RX ORDER — ACETAMINOPHEN 325 MG/1
650 TABLET ORAL EVERY 6 HOURS PRN
Status: DISCONTINUED | OUTPATIENT
Start: 2018-11-09 | End: 2018-11-10 | Stop reason: HOSPADM

## 2018-11-09 RX ORDER — LISINOPRIL 20 MG/1
20 TABLET ORAL DAILY
Status: DISCONTINUED | OUTPATIENT
Start: 2018-11-09 | End: 2018-11-10 | Stop reason: HOSPADM

## 2018-11-09 RX ORDER — ASPIRIN 325 MG
325 TABLET ORAL DAILY
Status: DISCONTINUED | OUTPATIENT
Start: 2018-11-10 | End: 2018-11-10 | Stop reason: HOSPADM

## 2018-11-09 RX ORDER — DULOXETIN HYDROCHLORIDE 60 MG/1
60 CAPSULE, DELAYED RELEASE ORAL DAILY
Status: DISCONTINUED | OUTPATIENT
Start: 2018-11-09 | End: 2018-11-10 | Stop reason: HOSPADM

## 2018-11-09 RX ORDER — ONDANSETRON 4 MG/1
4 TABLET, ORALLY DISINTEGRATING ORAL EVERY 6 HOURS PRN
Status: DISCONTINUED | OUTPATIENT
Start: 2018-11-09 | End: 2018-11-10 | Stop reason: HOSPADM

## 2018-11-09 RX ORDER — ONDANSETRON 4 MG/1
4 TABLET, FILM COATED ORAL EVERY 6 HOURS PRN
Status: DISCONTINUED | OUTPATIENT
Start: 2018-11-09 | End: 2018-11-10 | Stop reason: HOSPADM

## 2018-11-09 RX ORDER — ASPIRIN 325 MG
325 TABLET ORAL ONCE
Status: COMPLETED | OUTPATIENT
Start: 2018-11-09 | End: 2018-11-09

## 2018-11-09 RX ORDER — MONTELUKAST SODIUM 10 MG/1
10 TABLET ORAL NIGHTLY
Status: DISCONTINUED | OUTPATIENT
Start: 2018-11-09 | End: 2018-11-10 | Stop reason: HOSPADM

## 2018-11-09 RX ORDER — SODIUM CHLORIDE 0.9 % (FLUSH) 0.9 %
3-10 SYRINGE (ML) INJECTION AS NEEDED
Status: DISCONTINUED | OUTPATIENT
Start: 2018-11-09 | End: 2018-11-10 | Stop reason: HOSPADM

## 2018-11-09 RX ADMIN — ASPIRIN 325 MG: 325 TABLET ORAL at 14:43

## 2018-11-09 RX ADMIN — ACETAMINOPHEN 650 MG: 325 TABLET, FILM COATED ORAL at 23:55

## 2018-11-09 RX ADMIN — FLUTICASONE PROPIONATE 2 SPRAY: 50 SPRAY, METERED NASAL at 17:55

## 2018-11-09 RX ADMIN — Medication 3 ML: at 21:24

## 2018-11-09 RX ADMIN — DULOXETINE HYDROCHLORIDE 60 MG: 60 CAPSULE, DELAYED RELEASE ORAL at 17:55

## 2018-11-09 RX ADMIN — LISINOPRIL 20 MG: 20 TABLET ORAL at 17:54

## 2018-11-09 RX ADMIN — ATORVASTATIN CALCIUM 80 MG: 80 TABLET, FILM COATED ORAL at 21:24

## 2018-11-09 RX ADMIN — MONTELUKAST SODIUM 10 MG: 10 TABLET, FILM COATED ORAL at 21:24

## 2018-11-09 RX ADMIN — GADOBENATE DIMEGLUMINE 15 ML: 529 INJECTION, SOLUTION INTRAVENOUS at 19:30

## 2018-11-09 RX ADMIN — ACETAMINOPHEN 1000 MG: 500 TABLET, FILM COATED ORAL at 13:54

## 2018-11-09 RX ADMIN — LEVOTHYROXINE SODIUM 50 MCG: 50 TABLET ORAL at 17:55

## 2018-11-09 RX ADMIN — HYDROCODONE BITARTRATE AND ACETAMINOPHEN 1 TABLET: 5; 325 TABLET ORAL at 17:54

## 2018-11-09 NOTE — ED NOTES
Nursing report ED to floor  Joanna Ferris  77 y.o.  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    Code status:   Current Code Status     This patient does not have a recorded code status. Please follow your organizational policy for patients in this situation.          Allergies:   Patient has no known allergies.    Weight:   1    11/09/18  1137   Weight: 72.9 kg (160 lb 11.5 oz)       Most recent vitals:   Vitals:    11/09/18 1257 11/09/18 1313 11/09/18 1327 11/09/18 1414   BP: 158/83 161/88 166/88 172/93   Pulse: 62 62 57 54   Resp:    18   Temp:       TempSrc:       SpO2: 95% 98% 98% 96%   Weight:       Height:           Active LDAs/IV Access:   Lines, Drains & Airways    Active LDAs     Name:   Placement date:   Placement time:   Site:   Days:    Peripheral IV 11/09/18 1142 Right Antecubital  11/09/18    1142    Antecubital    less than 1                Labs (abnormal labs have a star):   Labs Reviewed   COMPREHENSIVE METABOLIC PANEL - Abnormal; Notable for the following:        Result Value    Glucose 113 (*)     Creatinine 1.04 (*)     eGFR Non  Amer 52 (*)     All other components within normal limits    Narrative:     The MDRD GFR formula is only valid for adults with stable renal function between ages 18 and 70.   CBC WITH AUTO DIFFERENTIAL - Abnormal; Notable for the following:     Hematocrit 45.7 (*)     RDW 13.7 (*)     All other components within normal limits   TROPONIN (IN-HOUSE) - Normal    Narrative:     Troponin T Reference Ranges:  Less than 0.03 ng/mL:    Negative for AMI  0.03 to 0.09 ng/mL:      Indeterminant for AMI  Greater than 0.09 ng/mL: Positive for AMI   POCT GLUCOSE FINGERSTICK - Normal    Narrative:     Meter: [id number] : [id number] Stevo Crooks RN   CBC AND DIFFERENTIAL    Narrative:      The following orders were created for panel order CBC & Differential.  Procedure                               Abnormality         Status                     ---------                               -----------         ------                     CBC Auto Differential[[id number]]        Abnormal            Final result                 Please view results for these tests on the individual orders.       EKG:   ECG 12 Lead             Meds given in ED:   Medications   acetaminophen (TYLENOL) tablet 1,000 mg (1,000 mg Oral Given 11/9/18 1354)       Imaging results:  Ct Head Without Contrast    Result Date: 11/9/2018  Normal head CT. The etiology of the confusion, headaches and memory loss is not established on this exam. If neurologic symptoms warrant an MRI the brain could be obtained for more complete assessment.  The results were communicated to Dr. Dia in the ER by telephone on 10/09/2018 at 1 PM.  Radiation dose reduction techniques were utilized, including automated exposure control and exposure modulation based on body size.         Ambulatory status:   - with assistance    Social issues:   Social History     Social History   • Marital status:      Spouse name: N/A   • Number of children: N/A   • Years of education: N/A     Occupational History   • Not on file.     Social History Main Topics   • Smoking status: Former Smoker   • Smokeless tobacco: Never Used   • Alcohol use Yes   • Drug use: No   • Sexual activity: Not on file     Other Topics Concern   • Not on file     Social History Narrative   • No narrative on file        Carlee Boyd RN  11/09/18 8181

## 2018-11-09 NOTE — ED PROVIDER NOTES
EMERGENCY DEPARTMENT ENCOUNTER    Room Number:  18/18  PCP: Ekaterina Santamaria MD  Historian: Patient, Paramedics, Family  History Limited By: Patient's mental status change      HPI  Chief Complaint: Altered Mental Status  Context: Joanna Ferris is a 77 y.o. female. Per paramedics, pt was last known well at her baseline mental status before 10:00 AM today. At about 10:00 AM today, pt's family noticed that pt had altered mental status in which pt exhibited memory difficulties and confusion. Specifically, pt was amnestic to the activities that she completed yesterday, had forgotten that her grandson was in his third year of college, and was unable to recall that one of her really good friends had undergone a significant surgery recently. Pt denies focal weakness/numbness, speech/visual difficulties, chest pain, dyspnea, documented fever, and neck stiffness. However, pt currently c/o a generalized headache and neck pain. Per family, pt has had no recent medication changes, but did not take her morning medications this AM. There are no other complaints at this time.          Altered Mental Status   Presenting symptoms: confusion and memory loss    Severity:  Moderate  Most recent episode:  Today  Episode history:  Continuous  Duration: family noticed pt's symptoms at about 10:00 AM today.  Timing:  Constant  Progression:  Waxing and waning  Associated symptoms: headaches    Associated symptoms: no fever and no weakness            PAST MEDICAL HISTORY  Active Ambulatory Problems     Diagnosis Date Noted   • CKD (chronic kidney disease) stage 3, GFR 30-59 ml/min (CMS/[facility]) 07/25/2016   • Allergic conjunctivitis 07/25/2016   • Atopic rhinitis 07/25/2016   • Arthritis 07/25/2016   • Depression 07/25/2016   • Generalized anxiety disorder 07/25/2016   • Hypercholesterolemia 07/25/2016   • Hypertension 07/25/2016   • Hypothyroidism 07/25/2016   • Osteopenia 07/25/2016   • Vitamin D deficiency 07/25/2016     Resolved  Ambulatory Problems     Diagnosis Date Noted   • No Resolved Ambulatory Problems     Past Medical History:   Diagnosis Date   • Hypertension    • Renal failure          PAST SURGICAL HISTORY  Past Surgical History:   Procedure Laterality Date   • ENDOVENOUS ABLATION SAPHENOUS VEIN W/ LASER     • HAND SURGERY     • REPLACEMENT TOTAL KNEE     • TONSILLECTOMY     • TUBAL ABDOMINAL LIGATION           FAMILY HISTORY  Family History   Problem Relation Age of Onset   • Alzheimer's disease Other    • Osteoporosis Other    • Parkinsonism Other          SOCIAL HISTORY  Social History     Social History   • Marital status:      Spouse name: N/A   • Number of children: N/A   • Years of education: N/A     Occupational History   • Not on file.     Social History Main Topics   • Smoking status: Former Smoker   • Smokeless tobacco: Never Used   • Alcohol use Yes   • Drug use: No   • Sexual activity: Not on file     Other Topics Concern   • Not on file     Social History Narrative   • No narrative on file         ALLERGIES  Patient has no known allergies.        REVIEW OF SYSTEMS  Review of Systems   Unable to perform ROS: Mental status change   Constitutional: Negative for fever.   Eyes: Negative for visual disturbance.   Respiratory: Negative for shortness of breath.    Cardiovascular: Negative for chest pain.   Musculoskeletal: Positive for neck pain. Negative for neck stiffness.   Neurological: Positive for headaches. Negative for speech difficulty, weakness and numbness.   Psychiatric/Behavioral: Positive for confusion and memory loss.            PHYSICAL EXAM  ED Triage Vitals   Temp Heart Rate Resp BP SpO2   11/09/18 1137 11/09/18 1137 11/09/18 1137 11/09/18 1138 11/09/18 1137   98.1 °F (36.7 °C) 81 18 (!) 192/90 97 %      Temp src Heart Rate Source Patient Position BP Location FiO2 (%)   11/09/18 1137 11/09/18 1137 -- 11/09/18 1137 --   Oral Monitor  Left arm          Physical Exam   Constitutional: She is oriented to  person, place, and time. No distress.   HENT:   Head: Normocephalic.   Mouth/Throat: Mucous membranes are normal.   Eyes: Pupils are equal, round, and reactive to light. EOM are normal.   Neck: Normal range of motion. Neck supple. No neck rigidity.   No meningismus.    Cardiovascular: Normal rate, regular rhythm and normal heart sounds.    Pulmonary/Chest: Effort normal and breath sounds normal. No respiratory distress. She has no decreased breath sounds. She has no wheezes. She has no rhonchi. She has no rales.   Abdominal: Soft. There is no tenderness. There is no rebound and no guarding.   Musculoskeletal: Normal range of motion.   Neurological: She is alert and oriented to person, place, and time. She has normal sensation.   Though pt is alert and oriented x3, pt exhibits mild short-term memory issues. No facial droop. There is no dysarthria, aphasia, or slurred speech. Sensation is intact to light touch bilaterally and is symmetrical in the face, BUEs, and BLEs. Strength is 5/5 and symmetrical in the BUEs and BLEs. Please see NIHSS for more detailed neurological exam.    Skin: Skin is warm and dry.   Psychiatric: Mood and affect normal.   Nursing note and vitals reviewed.          LAB RESULTS  Recent Results (from the past 24 hour(s))   POC Glucose Once    Collection Time: 11/09/18 11:37 AM   Result Value Ref Range    Glucose 92 70 - 130 mg/dL   Comprehensive Metabolic Panel    Collection Time: 11/09/18 11:53 AM   Result Value Ref Range    Glucose 113 (H) 65 - 99 mg/dL    BUN 18 8 - 23 mg/dL    Creatinine 1.04 (H) 0.57 - 1.00 mg/dL    Sodium 140 136 - 145 mmol/L    Potassium 4.4 3.5 - 5.2 mmol/L    Chloride 101 98 - 107 mmol/L    CO2 25.7 22.0 - 29.0 mmol/L    Calcium 10.2 8.6 - 10.5 mg/dL    Total Protein 7.3 6.0 - 8.5 g/dL    Albumin 4.30 3.50 - 5.20 g/dL    ALT (SGPT) 28 1 - 33 U/L    AST (SGOT) 28 1 - 32 U/L    Alkaline Phosphatase 75 39 - 117 U/L    Total Bilirubin 0.7 0.1 - 1.2 mg/dL    eGFR Non African  Amer 52 (L) >60 mL/min/1.73    Globulin 3.0 gm/dL    A/G Ratio 1.4 g/dL    BUN/Creatinine Ratio 17.3 7.0 - 25.0    Anion Gap 13.3 mmol/L   Troponin    Collection Time: 11/09/18 11:53 AM   Result Value Ref Range    Troponin T <0.010 0.000 - 0.030 ng/mL   CBC Auto Differential    Collection Time: 11/09/18 11:53 AM   Result Value Ref Range    WBC 7.29 4.50 - 10.70 10*3/mm3    RBC 4.84 3.90 - 5.20 10*6/mm3    Hemoglobin 15.4 11.9 - 15.5 g/dL    Hematocrit 45.7 (H) 35.6 - 45.5 %    MCV 94.4 80.5 - 98.2 fL    MCH 31.8 26.9 - 32.0 pg    MCHC 33.7 32.4 - 36.3 g/dL    RDW 13.7 (H) 11.7 - 13.0 %    RDW-SD 47.3 37.0 - 54.0 fl    MPV 10.9 6.0 - 12.0 fL    Platelets 218 140 - 500 10*3/mm3    Neutrophil % 64.2 42.7 - 76.0 %    Lymphocyte % 23.3 19.6 - 45.3 %    Monocyte % 10.6 5.0 - 12.0 %    Eosinophil % 1.4 0.3 - 6.2 %    Basophil % 0.5 0.0 - 1.5 %    Immature Grans % 0.1 0.0 - 0.5 %    Neutrophils, Absolute 4.68 1.90 - 8.10 10*3/mm3    Lymphocytes, Absolute 1.70 0.90 - 4.80 10*3/mm3    Monocytes, Absolute 0.77 0.20 - 1.20 10*3/mm3    Eosinophils, Absolute 0.10 0.00 - 0.70 10*3/mm3    Basophils, Absolute 0.04 0.00 - 0.20 10*3/mm3    Immature Grans, Absolute 0.01 0.00 - 0.03 10*3/mm3       Ordered the above labs and reviewed the results.        RADIOLOGY  CT Head Without Contrast   Preliminary Result   Normal head CT. The etiology of the confusion, headaches and memory loss   is not established on this exam. If neurologic symptoms warrant an MRI   the brain could be obtained for more complete assessment.        The results were communicated to Dr. Dia in the ER by telephone on   10/09/2018 at 1 PM.        Radiation dose reduction techniques were utilized, including automated   exposure control and exposure modulation based on body size.                   Ordered the above noted radiological studies. Reviewed by me in PACS.  Spoke with Dr. Bosch (radiologist) regarding CT Head scan  results.          PROCEDURES  Procedures      EKG:          EKG time: 11:55 AM  Rhythm/Rate: NSR rate 66  P waves and MA: Normal P waves  QRS, axis: Normal QRS   ST and T waves: Normal ST     Interpreted Contemporaneously by me, independently viewed  Similar compared to prior 06/2012        NIHSS performed at 11:49 AM:  Interval: baseline  1a. Level of Consciousness: 0-->Alert, keenly responsive  1b. LOC Questions: 0-->Answers both questions correctly  1c. LOC Commands: 0-->Performs both tasks correctly  2. Best Gaze: 0-->Normal  3. Visual: 0-->No visual loss  4. Facial Palsy: 0-->Normal symmetrical movements  5a. Motor Arm, Left: 0-->No drift, limb holds 90 (or 45) degrees for full 10 secs  5b. Motor Arm, Right: 0-->No drift, limb holds 90 (or 45) degrees for full 10 secs  6a. Motor Leg, Left: 0-->No drift, leg holds 30 degree position for full 5 secs  6b. Motor Leg, Right: 0-->No drift, leg holds 30 degree position for full 5 secs  7. Limb Ataxia: 0-->Absent  8. Sensory: 0-->Normal, no sensory loss  9. Best Language: 0-->No aphasia, normal  10. Dysarthria: 0-->Normal  11. Extinction and Inattention (formerly Neglect): 0-->No abnormality    Total (NIH Stroke Scale): 0          MEDICATIONS GIVEN IN ER  Medications   aspirin tablet 325 mg (not administered)   acetaminophen (TYLENOL) tablet 1,000 mg (1,000 mg Oral Given 11/9/18 1354)             PROGRESS AND CONSULTS  ED Course as of Nov 09 1443   Fri Nov 09, 2018   1349 1:49 PM  Patient here for global amnesia.  Has no focal weakness, numbness, speech, vision changes.  Does have repetitive questioning and cannot remember things from this am.  Head CT negative.  Tpa not indicated as NIH is 0.  Discussed with Dr. Kaye who will admit.  [SL]      ED Course User Index  [SL] Sonido Dia MD       11:45 AM:  Blood work, CT Head, and EKG ordered for further evaluation. Pt is not a candidate for tPA as pt has no focal neurological deficits.     12:29 PM:  Rechecked  pt. On re-evaluation, pt is alert. Pt is currently disoriented to situation - pt is unable to recall why she is in the ER and how she got to the hospital. Pt moves all extremities x4. Speech is normal. Nonfocal neurological exam.     Informed pt's family that I suspect that this may be transient global amnesia. Need for pt's admission to the hospital for further evaluation and treatment/management. Pt's family agrees with plan. Decision time to admit: Now.     1:08 PM:  Rechecked pt. Informed pt's family that pt's CT Head is negative acute. Need for pt's admission to the hospital for further evaluation of pt's transient global amnesia. Pt's family agrees with plan.     Placed call to [facility] for admission.     1:45 PM:  Discussed case with Dr. Kaye, hospitalist. He will admit pt to a telemetry bed.     2:01 PM:  Tylenol ordered to treat for pt's headache.          MEDICAL DECISION MAKING      MDM  Number of Diagnoses or Management Options     Amount and/or Complexity of Data Reviewed  Clinical lab tests: ordered and reviewed (Troponin is negative.)  Tests in the radiology section of CPT®: ordered and reviewed (CT Head:  Normal head CT. The etiology of the confusion, headaches and memory loss  is not established on this exam. If neurologic symptoms warrant an MRI  the brain could be obtained for more complete assessment. )  Tests in the medicine section of CPT®: reviewed and ordered (EKG interpreted.   )  Discussion of test results with the performing providers: yes (CT Head results d/w radiologist.   )  Obtain history from someone other than the patient: yes (Family provides significant history. )  Discuss the patient with other providers: yes (Discussed the case with Dr. Kaye, hospitalist.  )    Patient Progress  Patient progress: stable             DIAGNOSIS  Final diagnoses:   TGA (transient global amnesia)           DISPOSITION  Pt admitted to telemetry.    ADMISSION    Discussed treatment plan and reason for  admission with pt/family and admitting physician.  Pt/family voiced understanding of the plan for admission for further testing/treatment as needed.             Latest Documented Vital Signs:  As of 2:08 PM  BP- 166/88 HR- 57 Temp- 98.1 °F (36.7 °C) (Oral) O2 sat- 98%        --  Documentation assistance provided by stacy Paul for Dr. South MD.  Information recorded by the scribe was done at my direction and has been verified and validated by me.             Jt Paul  11/09/18 8180       Sonido Dia MD  11/09/18 5714

## 2018-11-09 NOTE — ED NOTES
Pt was brought in by ems for confusion and short term memory loss that started around 1000. States that she was unable to remember things from yesterday. Reports onset headache and neck pain in ambulance. Pt is alert and oriented x3, currently unable to recall important details from yesterday     Carlee Boyd, RN  11/09/18 7166

## 2018-11-09 NOTE — H&P
HISTORY AND PHYSICAL   [facility]        Patient Identification:  Name: Joanna Ferris  Age: 77 y.o.  Sex: female  :  1940  MRN: [id number]                     Primary Care Physician: Ekaterina Santamaria MD    Chief Complaint:  Confusion    History of Present Illness:   Mrs Ferris is a wonderfully pleasant 77-year-old female who is in pretty good shape given her age.  She is very active and is involved with exercise.  She states she takes medications for blood pressure cholesterol and anxiety as well as hypothyroidism.  She been in her usual health preceding this morning.  Her  had purchased a new rowing machine and helped bring it with additional family members just a day or so prior.  Patient walked up today had breakfast and stated that she wanted to get on the rowing machine since the weather was less than desirable so decided to stay inside for exercise.  Patient remembered not taking her blood pressure medications this morning but ultimately when she got off the rowing machine she was acutely confused.  The spouse who helped with the bulk of the history was stating that she had no idea that they have even purchase this rowing machine and she was confused about how it got down there.  There was some slurring of speech but there was absolutely no focal muscular deficit as she was ambulating normally.  She since has taking oral intake with no aspects of dysphagia.  She does admit to a headache, greatest in her frontal region.  She denies any focal changes to vision smell taste or sound.  She also denies any recent issues with fever chills night sweats.    Past Medical History:  Past Medical History:   Diagnosis Date   • Arthritis    • Disease of thyroid gland    • Elevated cholesterol    • Hypertension    • Renal failure      Past Surgical History:  Past Surgical History:   Procedure Laterality Date   • ENDOVENOUS ABLATION SAPHENOUS VEIN W/ LASER     • HAND SURGERY     • REPLACEMENT  TOTAL KNEE     • TONSILLECTOMY     • TUBAL ABDOMINAL LIGATION        Home Meds:  Prescriptions Prior to Admission   Medication Sig Dispense Refill Last Dose   • aspirin 81 MG tablet Take  by mouth daily.   Taking   • atorvastatin (LIPITOR) 80 MG tablet Take 1 tablet by mouth Every Night. 90 tablet 1 Taking   • azelastine (ASTEPRO) 0.15 % solution nasal spray 1 spray into each nostril 2 (Two) Times a Day. 30 mL 5 Taking   • Calcium Citrate-Vitamin D (CALCIUM + D PO) Take  by mouth daily.   Taking   • Cholecalciferol (VITAMIN D3) 2000 UNITS tablet Take 5,000 Units by mouth Daily.   Taking   • Coenzyme Q10 (CO Q10) 100 MG capsule Take 1 capsule by mouth Daily.      • DULoxetine (CYMBALTA) 60 MG capsule Take 1 capsule by mouth Daily. 90 capsule 1 Taking   • fluticasone (FLONASE) 50 MCG/ACT nasal spray 2 sprays into the nostril(s) as directed by provider Daily. 48 mL 1    • Lactobacillus (PROBIOTIC ACIDOPHILUS PO) Take 4 capsules by mouth Daily.      • levothyroxine (SYNTHROID, LEVOTHROID) 50 MCG tablet TAKE 1 TABLET BY MOUTH DAILY 90 tablet 0    • lisinopril (PRINIVIL,ZESTRIL) 10 MG tablet Take 1 tablet by mouth Daily. (Patient taking differently: Take 20 mg by mouth Daily.) 90 tablet 1 Taking   • montelukast (SINGULAIR) 10 MG tablet TAKE 1 TABLET BY MOUTH EVERY NIGHT 90 tablet 0    • Naphazoline-Pheniramine (OPCON-A OP) Apply 2 drops to eye(s) as directed by provider Daily.      • amoxicillin-clavulanate (AUGMENTIN) 875-125 MG per tablet Take 1 tablet by mouth 2 (Two) Times a Day. 20 tablet 0    • atorvastatin (LIPITOR) 80 MG tablet TAKE 1 TABLET BY MOUTH EVERY NIGHT 90 tablet 0 Taking   • benzonatate (TESSALON PERLES) 100 MG capsule Take 1 capsule by mouth 3 (Three) Times a Day As Needed for Cough. 30 capsule 0        Allergies:  No Known Allergies  Immunizations:  Immunization History   Administered Date(s) Administered   • Flu Vaccine High Dose PF 65YR+ 10/04/2015, 10/03/2016, 09/23/2017   • Influenza Quad Vaccine  "(Inpatient) 10/10/2018   • Influenza TIV (IM) 10/04/2015   • Pneumococcal Conjugate 13-Valent (PCV13) 12/15/2015     Social History:   Social History     Social History Narrative   • No narrative on file     Social History     Social History   • Marital status:      Spouse name: N/A   • Number of children: N/A   • Years of education: N/A     Occupational History   • Not on file.     Social History Main Topics   • Smoking status: Former Smoker   • Smokeless tobacco: Never Used   • Alcohol use 4.2 oz/week     7 Shots of liquor per week   • Drug use: No   • Sexual activity: Defer     Other Topics Concern   • Not on file     Social History Narrative   • No narrative on file       Family History:  Family History   Problem Relation Age of Onset   • Alzheimer's disease Other    • Osteoporosis Other    • Parkinsonism Other         Review of Systems  See history of present illness and past medical history.  Patient denies fever chills night sweats.  Denies any focal changes to vision spelled taste or sound.  Missed a headache but denies nausea or vomiting.  Denies any dysphagia chest pain palpitations cough shortness of breath.  Denies any dysuria abdominal pain diarrhea bruising bleeding or focal loss of function.  Remainder of ROS is negative.    Objective:  tMax 24 hrs: Temp (24hrs), Av.1 °F (36.7 °C), Min:98.1 °F (36.7 °C), Max:98.1 °F (36.7 °C)    Vitals Ranges:   Temp:  [98.1 °F (36.7 °C)] 98.1 °F (36.7 °C)  Heart Rate:  [54-81] 54  Resp:  [18] 18  BP: (151-192)/(79-98) 172/93      Exam:  /93   Pulse 54   Temp 98.1 °F (36.7 °C) (Oral)   Resp 18   Ht 168.9 cm (66.5\")   Wt 72.9 kg (160 lb 11.5 oz)   SpO2 96%   BMI 25.55 kg/m²     General Appearance:    Alert, cooperative, no distress, Aox3, fluent speech, seems appropriate    Head:    Normocephalic, without obvious abnormality, atraumatic   Eyes:    PERRL, conjunctiva/corneas clear, EOM's intact, both eyes   Ears:    Normal external ear canals, " both ears   Nose:   Nares normal, septum midline, mucosa normal, no drainage    or sinus tenderness   Throat:   Lips, mucosa, and tongue normal   Neck:   Supple, symmetrical, trachea midline, no adenopathy;     thyroid:  no enlargement/tenderness/nodules; no JVD       Lungs:     Clear to auscultation bilaterally, respirations unlabored   Chest Wall:    No tenderness or deformity    Heart:    Regular rate and rhythm, S1 and S2 normal, no murmur, rub   or gallop   Abdomen:     Soft, non-tender, bowel sounds active all four quadrants,     no masses, no hepatomegaly, no splenomegaly   Extremities:   Extremities normal, atraumatic, no cyanosis or edema   Pulses:   2+ and symmetric all extremities   Skin:   Skin color, texture, turgor normal, no rashes or lesions   Lymph nodes:   Cervical and supraclavicular nodes normal   Neurologic:   CNII-XII intact, normal strength, neg FTN, no focal deficits, didn't evaluate gait       .    Data Review:  Labs in chart were reviewed.             Imaging Results (all)     Procedure Component Value Units Date/Time    CT Head Without Contrast [[id number]] Collected:  11/09/18 1333     Updated:  11/09/18 1333    Narrative:       EMERGENCY NONCONTRAST HEAD CT 11/09/2018     CLINICAL HISTORY: Confusion and headaches, memory loss.     TECHNIQUE: Spiral CT images were obtained from the base of the skull to  the vertex without intravenous contrast. Images were reformatted and  submitted in 3 mm thick axial CT sections with brain algorithm. 2 mm  thick sagittal and coronal reconstructions were performed.     There are no prior studies from [facility] for  comparison.     FINDINGS: The brain parenchyma is normal in attenuation. The ventricles  are normal in size. I see no focal mass effect and no midline shift and  no extra-axial fluid collections are identified. There is no evidence of  acute intracranial hemorrhage. The paranasal sinuses and the mastoid air  cells and middle ear  cavities are clear. The calvarium and skull base  and orbits are unremarkable.       Impression:       Normal head CT. The etiology of the confusion, headaches and memory loss  is not established on this exam. If neurologic symptoms warrant an MRI  the brain could be obtained for more complete assessment.      The results were communicated to Dr. Dia in the ER by telephone on  10/09/2018 at 1 PM.      Radiation dose reduction techniques were utilized, including automated  exposure control and exposure modulation based on body size.                  Assessment:    TGA (transient global amnesia)    CKD (chronic kidney disease) stage 3, GFR 30-59 ml/min (CMS/[facility])    Generalized anxiety disorder    Hypercholesterolemia    Hypertension    Hypothyroidism      Plan:  While transient global amnesia could indeed be the leading diagnosis, we'll go ahead and check an MRI of the brain with and without contrast as well as consult neurology for any additional recommendations.   -I asked that full dose aspirin be administered in the ER - patient has tolerated Tylenol as well as aspirin without any aspects of dysphagia but ultimately will follow protocol to ensure bedside swallow is adequate before further by mouth is administered.   -I will otherwise modify our typical TIA workup and just perform an MRI of the brain with and without contrast as well as an echocardiogram.  Will order MRA if neurology feels further investigation is warranted.   -Add TSH to a.m. labs and continue with scheduled levothyroxine   -Resume lisinopril if MRI of the brain is negative for any acute intracranial abnormality as this very well could possibly been hypertensive induced as she did not take her medications this morning before she chose to exert herself with exercise.    SCDs for DVT prophylaxis    Observation admission day 1 - further recommendations to follow as clinical course unfolds    Harish Kaye MD  11/9/2018  3:15 PM

## 2018-11-10 ENCOUNTER — APPOINTMENT (OUTPATIENT)
Dept: CARDIOLOGY | Facility: HOSPITAL | Age: 78
End: 2018-11-10
Attending: HOSPITALIST

## 2018-11-10 ENCOUNTER — APPOINTMENT (OUTPATIENT)
Dept: CT IMAGING | Facility: HOSPITAL | Age: 78
End: 2018-11-10

## 2018-11-10 VITALS
WEIGHT: 161 LBS | HEIGHT: 67 IN | RESPIRATION RATE: 18 BRPM | HEART RATE: 61 BPM | TEMPERATURE: 98.2 F | DIASTOLIC BLOOD PRESSURE: 71 MMHG | SYSTOLIC BLOOD PRESSURE: 126 MMHG | BODY MASS INDEX: 25.27 KG/M2 | OXYGEN SATURATION: 95 %

## 2018-11-10 PROBLEM — I67.4 HYPERTENSIVE ENCEPHALOPATHY: Status: ACTIVE | Noted: 2018-11-09

## 2018-11-10 LAB
AORTIC DIMENSIONLESS INDEX: 0.9 (DI)
BH CV ECHO MEAS - ACS: 1.6 CM
BH CV ECHO MEAS - AO MAX PG (FULL): 0.49 MMHG
BH CV ECHO MEAS - AO MAX PG: 6.2 MMHG
BH CV ECHO MEAS - AO MEAN PG (FULL): 0 MMHG
BH CV ECHO MEAS - AO MEAN PG: 3 MMHG
BH CV ECHO MEAS - AO ROOT AREA (BSA CORRECTED): 1.5
BH CV ECHO MEAS - AO ROOT AREA: 6.2 CM^2
BH CV ECHO MEAS - AO ROOT DIAM: 2.8 CM
BH CV ECHO MEAS - AO V2 MAX: 124 CM/SEC
BH CV ECHO MEAS - AO V2 MEAN: 85.5 CM/SEC
BH CV ECHO MEAS - AO V2 VTI: 26.3 CM
BH CV ECHO MEAS - ASC AORTA: 2.9 CM
BH CV ECHO MEAS - AVA(I,A): 3 CM^2
BH CV ECHO MEAS - AVA(I,D): 3 CM^2
BH CV ECHO MEAS - AVA(V,A): 3.3 CM^2
BH CV ECHO MEAS - AVA(V,D): 3.3 CM^2
BH CV ECHO MEAS - BSA(HAYCOCK): 1.9 M^2
BH CV ECHO MEAS - BSA: 1.8 M^2
BH CV ECHO MEAS - BZI_BMI: 25.2 KILOGRAMS/M^2
BH CV ECHO MEAS - BZI_METRIC_HEIGHT: 170.2 CM
BH CV ECHO MEAS - BZI_METRIC_WEIGHT: 73 KG
BH CV ECHO MEAS - EDV(CUBED): 74.1 ML
BH CV ECHO MEAS - EDV(MOD-SP2): 36 ML
BH CV ECHO MEAS - EDV(MOD-SP4): 41 ML
BH CV ECHO MEAS - EDV(TEICH): 78.6 ML
BH CV ECHO MEAS - EF(CUBED): 76.3 %
BH CV ECHO MEAS - EF(MOD-BP): 68.1 %
BH CV ECHO MEAS - EF(MOD-SP2): 63.9 %
BH CV ECHO MEAS - EF(MOD-SP4): 68.3 %
BH CV ECHO MEAS - EF(TEICH): 68.7 %
BH CV ECHO MEAS - ESV(CUBED): 17.6 ML
BH CV ECHO MEAS - ESV(MOD-SP2): 13 ML
BH CV ECHO MEAS - ESV(MOD-SP4): 13 ML
BH CV ECHO MEAS - ESV(TEICH): 24.6 ML
BH CV ECHO MEAS - FS: 38.1 %
BH CV ECHO MEAS - IVS/LVPW: 0.86
BH CV ECHO MEAS - IVSD: 0.6 CM
BH CV ECHO MEAS - LAT PEAK E' VEL: 5 CM/SEC
BH CV ECHO MEAS - LV DIASTOLIC VOL/BSA (35-75): 22.2 ML/M^2
BH CV ECHO MEAS - LV MASS(C)D: 77.4 GRAMS
BH CV ECHO MEAS - LV MASS(C)DI: 42 GRAMS/M^2
BH CV ECHO MEAS - LV MAX PG: 5.7 MMHG
BH CV ECHO MEAS - LV MEAN PG: 3 MMHG
BH CV ECHO MEAS - LV SYSTOLIC VOL/BSA (12-30): 7 ML/M^2
BH CV ECHO MEAS - LV V1 MAX: 119 CM/SEC
BH CV ECHO MEAS - LV V1 MEAN: 74.5 CM/SEC
BH CV ECHO MEAS - LV V1 VTI: 22.8 CM
BH CV ECHO MEAS - LVIDD: 4.2 CM
BH CV ECHO MEAS - LVIDS: 2.6 CM
BH CV ECHO MEAS - LVLD AP2: 5.7 CM
BH CV ECHO MEAS - LVLD AP4: 6.6 CM
BH CV ECHO MEAS - LVLS AP2: 4.9 CM
BH CV ECHO MEAS - LVLS AP4: 5 CM
BH CV ECHO MEAS - LVOT AREA (M): 3.5 CM^2
BH CV ECHO MEAS - LVOT AREA: 3.5 CM^2
BH CV ECHO MEAS - LVOT DIAM: 2.1 CM
BH CV ECHO MEAS - LVPWD: 0.7 CM
BH CV ECHO MEAS - MED PEAK E' VEL: 5 CM/SEC
BH CV ECHO MEAS - MV A DUR: 0.15 SEC
BH CV ECHO MEAS - MV A MAX VEL: 93.1 CM/SEC
BH CV ECHO MEAS - MV DEC SLOPE: 193 CM/SEC^2
BH CV ECHO MEAS - MV DEC TIME: 0.25 SEC
BH CV ECHO MEAS - MV E MAX VEL: 46.6 CM/SEC
BH CV ECHO MEAS - MV E/A: 0.5
BH CV ECHO MEAS - MV MAX PG: 4.4 MMHG
BH CV ECHO MEAS - MV MEAN PG: 1 MMHG
BH CV ECHO MEAS - MV P1/2T MAX VEL: 66.5 CM/SEC
BH CV ECHO MEAS - MV P1/2T: 100.9 MSEC
BH CV ECHO MEAS - MV V2 MAX: 105 CM/SEC
BH CV ECHO MEAS - MV V2 MEAN: 50 CM/SEC
BH CV ECHO MEAS - MV V2 VTI: 27.3 CM
BH CV ECHO MEAS - MVA P1/2T LCG: 3.3 CM^2
BH CV ECHO MEAS - MVA(P1/2T): 2.2 CM^2
BH CV ECHO MEAS - MVA(VTI): 2.9 CM^2
BH CV ECHO MEAS - PA ACC TIME: 0.08 SEC
BH CV ECHO MEAS - PA MAX PG (FULL): 2.4 MMHG
BH CV ECHO MEAS - PA MAX PG: 4.6 MMHG
BH CV ECHO MEAS - PA PR(ACCEL): 42.6 MMHG
BH CV ECHO MEAS - PA V2 MAX: 107 CM/SEC
BH CV ECHO MEAS - PULM A REVS DUR: 0.13 SEC
BH CV ECHO MEAS - PULM A REVS VEL: 48.5 CM/SEC
BH CV ECHO MEAS - PULM DIAS VEL: 43.7 CM/SEC
BH CV ECHO MEAS - PULM S/D: 1.9
BH CV ECHO MEAS - PULM SYS VEL: 83.4 CM/SEC
BH CV ECHO MEAS - PVA(V,A): 1.9 CM^2
BH CV ECHO MEAS - PVA(V,D): 1.9 CM^2
BH CV ECHO MEAS - QP/QS: 0.48
BH CV ECHO MEAS - RAP SYSTOLE: 3 MMHG
BH CV ECHO MEAS - RV MAX PG: 2.1 MMHG
BH CV ECHO MEAS - RV MEAN PG: 1 MMHG
BH CV ECHO MEAS - RV V1 MAX: 73.2 CM/SEC
BH CV ECHO MEAS - RV V1 MEAN: 49.5 CM/SEC
BH CV ECHO MEAS - RV V1 VTI: 13.5 CM
BH CV ECHO MEAS - RVOT AREA: 2.8 CM^2
BH CV ECHO MEAS - RVOT DIAM: 1.9 CM
BH CV ECHO MEAS - RVSP: 30.2 MMHG
BH CV ECHO MEAS - SI(AO): 87.8 ML/M^2
BH CV ECHO MEAS - SI(CUBED): 30.6 ML/M^2
BH CV ECHO MEAS - SI(LVOT): 42.8 ML/M^2
BH CV ECHO MEAS - SI(MOD-SP2): 12.5 ML/M^2
BH CV ECHO MEAS - SI(MOD-SP4): 15.2 ML/M^2
BH CV ECHO MEAS - SI(TEICH): 29.3 ML/M^2
BH CV ECHO MEAS - SV(AO): 161.9 ML
BH CV ECHO MEAS - SV(CUBED): 56.5 ML
BH CV ECHO MEAS - SV(LVOT): 79 ML
BH CV ECHO MEAS - SV(MOD-SP2): 23 ML
BH CV ECHO MEAS - SV(MOD-SP4): 28 ML
BH CV ECHO MEAS - SV(RVOT): 38.3 ML
BH CV ECHO MEAS - SV(TEICH): 54 ML
BH CV ECHO MEAS - TAPSE (>1.6): 1.97 CM2
BH CV ECHO MEAS - TR MAX VEL: 261 CM/SEC
BH CV ECHO MEASUREMENTS AVERAGE E/E' RATIO: 9.32
BH CV VAS BP LEFT ARM: NORMAL MMHG
BH CV XLRA - RV BASE: 1.94 CM
BH CV XLRA - TDI S': 19.3 CM/SEC
CHOLEST SERPL-MCNC: 202 MG/DL (ref 0–200)
HBA1C MFR BLD: 5.1 % (ref 4.8–5.6)
HDLC SERPL-MCNC: 72 MG/DL (ref 40–60)
LDLC SERPL CALC-MCNC: 109 MG/DL (ref 0–100)
LDLC/HDLC SERPL: 1.52 {RATIO}
LEFT ATRIUM VOLUME INDEX: 10.2 ML/M2
MAXIMAL PREDICTED HEART RATE: 144 BPM
STRESS TARGET HR: 122 BPM
TRIGL SERPL-MCNC: 103 MG/DL (ref 0–150)
TSH SERPL DL<=0.05 MIU/L-ACNC: 4.98 MIU/ML (ref 0.27–4.2)
VLDLC SERPL-MCNC: 20.6 MG/DL (ref 5–40)

## 2018-11-10 PROCEDURE — G0378 HOSPITAL OBSERVATION PER HR: HCPCS

## 2018-11-10 PROCEDURE — 96374 THER/PROPH/DIAG INJ IV PUSH: CPT

## 2018-11-10 PROCEDURE — 70498 CT ANGIOGRAPHY NECK: CPT

## 2018-11-10 PROCEDURE — 93306 TTE W/DOPPLER COMPLETE: CPT | Performed by: INTERNAL MEDICINE

## 2018-11-10 PROCEDURE — 25010000002 KETOROLAC TROMETHAMINE PER 15 MG: Performed by: HOSPITALIST

## 2018-11-10 PROCEDURE — 80061 LIPID PANEL: CPT | Performed by: HOSPITALIST

## 2018-11-10 PROCEDURE — 84443 ASSAY THYROID STIM HORMONE: CPT | Performed by: HOSPITALIST

## 2018-11-10 PROCEDURE — 93306 TTE W/DOPPLER COMPLETE: CPT

## 2018-11-10 PROCEDURE — 0 IOPAMIDOL PER 1 ML: Performed by: HOSPITALIST

## 2018-11-10 PROCEDURE — 83036 HEMOGLOBIN GLYCOSYLATED A1C: CPT | Performed by: HOSPITALIST

## 2018-11-10 RX ORDER — HYDROCODONE BITARTRATE AND ACETAMINOPHEN 5; 325 MG/1; MG/1
1 TABLET ORAL EVERY 6 HOURS PRN
Status: DISCONTINUED | OUTPATIENT
Start: 2018-11-10 | End: 2018-11-10 | Stop reason: HOSPADM

## 2018-11-10 RX ORDER — HYDROCODONE BITARTRATE AND ACETAMINOPHEN 5; 325 MG/1; MG/1
1 TABLET ORAL EVERY 6 HOURS PRN
Qty: 10 TABLET | Refills: 0 | Status: SHIPPED | OUTPATIENT
Start: 2018-11-10 | End: 2018-11-20

## 2018-11-10 RX ORDER — KETOROLAC TROMETHAMINE 30 MG/ML
15 INJECTION, SOLUTION INTRAMUSCULAR; INTRAVENOUS EVERY 6 HOURS PRN
Status: DISCONTINUED | OUTPATIENT
Start: 2018-11-10 | End: 2018-11-10 | Stop reason: HOSPADM

## 2018-11-10 RX ORDER — ACETAMINOPHEN 325 MG/1
650 TABLET ORAL EVERY 6 HOURS PRN
Start: 2018-11-10

## 2018-11-10 RX ADMIN — Medication 3 ML: at 08:58

## 2018-11-10 RX ADMIN — IOPAMIDOL 90 ML: 755 INJECTION, SOLUTION INTRAVENOUS at 16:30

## 2018-11-10 RX ADMIN — HYDROCODONE BITARTRATE AND ACETAMINOPHEN 1 TABLET: 5; 325 TABLET ORAL at 17:36

## 2018-11-10 RX ADMIN — KETOROLAC TROMETHAMINE 15 MG: 30 INJECTION, SOLUTION INTRAMUSCULAR at 11:56

## 2018-11-10 RX ADMIN — ACETAMINOPHEN 650 MG: 325 TABLET, FILM COATED ORAL at 06:04

## 2018-11-10 RX ADMIN — DULOXETINE HYDROCHLORIDE 60 MG: 60 CAPSULE, DELAYED RELEASE ORAL at 08:57

## 2018-11-10 RX ADMIN — LEVOTHYROXINE SODIUM 50 MCG: 50 TABLET ORAL at 06:06

## 2018-11-10 RX ADMIN — LISINOPRIL 20 MG: 20 TABLET ORAL at 08:58

## 2018-11-10 RX ADMIN — FLUTICASONE PROPIONATE 2 SPRAY: 50 SPRAY, METERED NASAL at 09:01

## 2018-11-10 NOTE — PLAN OF CARE
Problem: Patient Care Overview  Goal: Plan of Care Review  Outcome: Ongoing (interventions implemented as appropriate)   11/09/18 1950   Coping/Psychosocial   Plan of Care Reviewed With patient   Plan of Care Review   Progress no change   OTHER   Outcome Summary Admitted from ER with TGA vs CVA. NIH is 0 upon admission. c/o pain base of neck, given one time dose Lortab. Awaiting results of Carotid duplex and MRI. will closely monitor.       Problem: Confusion, Acute (Adult)  Goal: Identify Related Risk Factors and Signs and Symptoms  Outcome: Outcome(s) achieved Date Met: 11/09/18    Goal: Cognitive/Functional Impairments Minimized  Outcome: Ongoing (interventions implemented as appropriate)    Goal: Safety  Outcome: Ongoing (interventions implemented as appropriate)      Problem: Fall Risk (Adult)  Goal: Identify Related Risk Factors and Signs and Symptoms  Outcome: Outcome(s) achieved Date Met: 11/09/18    Goal: Absence of Fall  Outcome: Ongoing (interventions implemented as appropriate)

## 2018-11-10 NOTE — DISCHARGE SUMMARY
[facility]    Date of Discharge:  11/10/2018    PCP: Ekaterina Santamaria MD    Discharge Diagnosis:   Active Hospital Problems    Diagnosis Date Noted   • **Hypertensive encephalopathy [I67.4] 11/09/2018   • CKD (chronic kidney disease) stage 3, GFR 30-59 ml/min (CMS/Formerly [facility]) [N18.3] 07/25/2016   • Hypertension [I10] 07/25/2016   • Hypercholesterolemia [E78.00] 07/25/2016   • Hypothyroidism [E03.9] 07/25/2016   • Generalized anxiety disorder [F41.1] 07/25/2016      Resolved Hospital Problems   No resolved problems to display.     Procedures Performed       Consults     Date and Time Order Name Status Description    11/9/2018 0134 Inpatient Neurology Consult Stroke      11/9/2018 1308 [facility] (on-call MD unless specified) Completed         Hospital Course  Please see history and physical for details. Patient is a 77 y.o. female initially admitted by myself due to complaints of confusion.  The concern was for transient global amnesia at admission.  I personally feel that this patient likely had an hypertensive encephalopathy brought on from not taking her a.m. blood pressure medication then proceeded to do a new workout.  Her current headache and neck discomfort is due to her recent work out as she bought a rowing machine.  At this juncture her vital signs are stable as well as afebrile.  We've basically brought her to the hospital to get an MRI of the brain which is negative for any acute disease.  Also checked an echocardiogram of the report is pending and I'll defer to PCP to follow that up in an outpatient setting.  Carotid Doppler was negative for stenosis.  He is completely alert and oriented ×3 and feels back to baseline.  Her TSH was mildly elevated and she is on supplemental levothyroxine and I counseled her to follow-up with her PCP to consider repeating this test and later date.  I did not put the patient through an MRA.  I initially did place a neurology  consult though we'll hold that go ahead and discontinue the consult since I feel the patient is medically stable for discharge to home.  Questions answered to patient with son present at bedside and they're both amenable to the above plan.  They do not need any additional home health needs.  Patient counseled in regards to follow-up with PCP or the next couple weeks.    Addendum - as I completed this discharge summary, to be extra thorough Im going to check a CT angiogram of her neck just to ensure there is no aspects of vertebral dissection given her complaints of headache and neck pain.  I still feel that she has chronic issues of neck pain and her recent rowing machine likely has exacerbated this but given recent amnesia as well as persistent neck pain, I'm going to check this test stat and asked nurse to notify me once the results are back and if negative then we will proceed with disposition as previously stated.    Condition on Discharge: Improved.     Temp:  [97.7 °F (36.5 °C)-98.2 °F (36.8 °C)] 98.2 °F (36.8 °C)  Heart Rate:  [54-63] 61  Resp:  [16-18] 18  BP: (123-174)/(65-94) 123/76  Body mass index is 25.22 kg/m².    Physical Exam   Constitutional: She is oriented to person, place, and time. She appears well-developed and well-nourished.   HENT:   Head: Normocephalic and atraumatic.   Eyes: EOM are normal. Pupils are equal, round, and reactive to light.   Neck: Neck supple. No JVD present.   Cardiovascular: Normal rate and regular rhythm.   Pulmonary/Chest: Effort normal and breath sounds normal.   Abdominal: Soft. Bowel sounds are normal. She exhibits no distension. There is no tenderness. There is no guarding.   Musculoskeletal: She exhibits no edema.   Neurological: She is alert and oriented to person, place, and time. No cranial nerve deficit. Coordination normal.   Psychiatric: She has a normal mood and affect. Her behavior is normal.        Discharge Medications      New Medications       Instructions Start Date   acetaminophen 325 MG tablet  Commonly known as:  TYLENOL   650 mg, Oral, Every 6 Hours PRN      HYDROcodone-acetaminophen 5-325 MG per tablet  Commonly known as:  NORCO   1 tablet, Oral, Every 6 Hours PRN         Changes to Medications      Instructions Start Date   atorvastatin 80 MG tablet  Commonly known as:  LIPITOR  What changed:  Another medication with the same name was removed. Continue taking this medication, and follow the directions you see here.   80 mg, Oral, Nightly      lisinopril 10 MG tablet  Commonly known as:  PRINIVIL,ZESTRIL  What changed:  how much to take   10 mg, Oral, Daily         Continue These Medications      Instructions Start Date   aspirin 81 MG tablet   Oral, Daily      azelastine 0.15 % solution nasal spray  Commonly known as:  ASTEPRO   1 spray, Nasal, 2 Times Daily      benzonatate 100 MG capsule  Commonly known as:  TESSALON PERLES   100 mg, Oral, 3 Times Daily PRN      CALCIUM + D PO   Oral, Daily      Co Q10 100 MG capsule   1 capsule, Oral, Daily      DULoxetine 60 MG capsule  Commonly known as:  CYMBALTA   60 mg, Oral, Daily      fluticasone 50 MCG/ACT nasal spray  Commonly known as:  FLONASE   2 sprays, Nasal, Daily      levothyroxine 50 MCG tablet  Commonly known as:  SYNTHROID, LEVOTHROID   50 mcg, Oral, Daily      montelukast 10 MG tablet  Commonly known as:  SINGULAIR   10 mg, Oral, Nightly      OPCON-A OP   2 drops, Ophthalmic, Daily      PROBIOTIC ACIDOPHILUS PO   4 capsules, Oral, Daily      Vitamin D3 2000 units tablet   5,000 Units, Oral, Daily         Stop These Medications    amoxicillin-clavulanate 875-125 MG per tablet  Commonly known as:  AUGMENTIN             Additional Instructions for the Follow-ups that You Need to Schedule     Discharge Follow-up with PCP   As directed       Currently Documented PCP:    Ekaterina Santamaria MD    PCP Phone Number:    [phone]     Follow Up Details:  pcp 2-3 weeks           Follow-up Information      Ekaterina Santamaria MD .    Specialty:  Family Medicine  Why:  pcp 2-3 weeks  Contact information:  [address]  [address] [place] [id number] [phone]                 Test Results Pending at Discharge     Harish Kaye MD  11/10/18  1:49 PM    Discharge time spent greater than 30 minutes.

## 2018-11-12 NOTE — PROGRESS NOTES
Case Management Discharge Note    Final Note: Discharged home    Destination      No service has been selected for the patient.      Durable Medical Equipment      No service has been selected for the patient.      Dialysis/Infusion      No service has been selected for the patient.      Home Medical Care      No service has been selected for the patient.      Community Resources      No service has been selected for the patient.        Other: (private auto)    Final Discharge Disposition Code: 01 - home or self-care

## 2018-11-20 ENCOUNTER — OFFICE VISIT (OUTPATIENT)
Dept: INTERNAL MEDICINE | Facility: CLINIC | Age: 78
End: 2018-11-20

## 2018-11-20 VITALS
HEIGHT: 67 IN | SYSTOLIC BLOOD PRESSURE: 120 MMHG | OXYGEN SATURATION: 98 % | HEART RATE: 80 BPM | WEIGHT: 157 LBS | DIASTOLIC BLOOD PRESSURE: 80 MMHG | BODY MASS INDEX: 24.64 KG/M2 | TEMPERATURE: 98 F

## 2018-11-20 DIAGNOSIS — E03.9 ACQUIRED HYPOTHYROIDISM: ICD-10-CM

## 2018-11-20 DIAGNOSIS — G45.4 TRANSIENT GLOBAL AMNESIA: ICD-10-CM

## 2018-11-20 DIAGNOSIS — I67.4 HYPERTENSIVE ENCEPHALOPATHY: Primary | ICD-10-CM

## 2018-11-20 DIAGNOSIS — R93.0 ABNORMAL MRI OF HEAD: ICD-10-CM

## 2018-11-20 PROCEDURE — 99214 OFFICE O/P EST MOD 30 MIN: CPT | Performed by: FAMILY MEDICINE

## 2018-11-20 NOTE — PROGRESS NOTES
Subjective   Joanna Ferris is a 77 y.o. female.   Chief Complaint   Patient presents with   • Memory Loss     hospital follow up        History of Present Illness     #1 Trancient global amnesia/hypertensive encephalopathy- patient was admitted to hospital from 11/9/18-11/10/18 for confusion and memory loss.  It happened on a day when she did not take her blood pressure medications, she had coffee and went to exercise on the rowing machine.  Blood pressure was at 199/100.  She had brain MRI done which was negative, except of the 5 mm bright focus on the skull vertex- radiologist recommended interval follow-up.Carotid Doppler was negative for stenosis.  Echo was negative.  CT angiography of the neck was negative.  Symptoms resolved while in hospital.  She had no problems since discharge.  Blood work was negative except of mild TSH elevation at 4.98.    The following portions of the patient's history were reviewed and updated as appropriate: allergies, current medications, past family history, past medical history, past social history, past surgical history and problem list.    Review of Systems   Constitutional: Negative.    HENT: Negative.    Respiratory: Negative.    Cardiovascular: Negative.    Genitourinary: Negative.    Neurological: Negative.          Objective   Wt Readings from Last 3 Encounters:   11/20/18 71.2 kg (157 lb)   11/10/18 73 kg (161 lb)   11/09/18 72.9 kg (160 lb 11.5 oz)      Vitals:    11/20/18 1319   BP: 120/80   Pulse: 80   Temp: 98 °F (36.7 °C)   SpO2: 98%     Temp Readings from Last 3 Encounters:   11/20/18 98 °F (36.7 °C)   11/10/18 98.2 °F (36.8 °C) (Oral)   09/13/18 97.9 °F (36.6 °C)     BP Readings from Last 3 Encounters:   11/20/18 120/80   11/10/18 126/71   09/13/18 122/68     Pulse Readings from Last 3 Encounters:   11/20/18 80   11/10/18 61   09/13/18 76       Physical Exam   Constitutional: She is oriented to person, place, and time. She appears well-developed and well-nourished.    HENT:   Head: Normocephalic and atraumatic.   Eyes: EOM are normal. Pupils are equal, round, and reactive to light.   Neck: Neck supple. Carotid bruit is not present. No thyromegaly present.   Cardiovascular: Normal rate, regular rhythm and normal heart sounds.   Pulmonary/Chest: Effort normal and breath sounds normal.   Neurological: She is alert and oriented to person, place, and time.   Skin: Skin is warm, dry and intact.   Psychiatric: She has a normal mood and affect. Her behavior is normal.       Assessment/Plan   Joanna was seen today for memory loss, hypothyroidism and hypertension.    Diagnoses and all orders for this visit:    Hypertensive encephalopathy    Transient global amnesia    Acquired hypothyroidism  -     Thyroid Cascade Profile; Future    Abnormal MRI of head        #1 transient global amnesia/hypertensive encephalopathy-hospital records reviewed.  Blood work results reviewed.  Imiging tests results were reviewed.  Results of echo  reviewed.  Medications were updated.     #2 abnormal thyroid tests-check TSH in a month.  Continue current treatment for now.     #3 daneil abnormality on MRI.  Will contacted Dr. Wilson for his recommendations on follow-up and he recommended non-contrast CT in 6 months.    #4 HTN- controlled.  Continue same.  Patient makes sure to take her medications every day.    Current outpatient and discharge medications have been reconciled for the patient.  Reviewed by: Ekaterina Santamaria MD

## 2018-12-17 RX ORDER — DULOXETIN HYDROCHLORIDE 60 MG/1
60 CAPSULE, DELAYED RELEASE ORAL DAILY
Qty: 90 CAPSULE | Refills: 0 | Status: SHIPPED | OUTPATIENT
Start: 2018-12-17 | End: 2019-03-13 | Stop reason: SDUPTHER

## 2018-12-18 RX ORDER — LISINOPRIL 10 MG/1
10 TABLET ORAL DAILY
Qty: 90 TABLET | Refills: 0 | Status: SHIPPED | OUTPATIENT
Start: 2018-12-18 | End: 2019-03-18 | Stop reason: SDUPTHER

## 2018-12-19 RX ORDER — ATORVASTATIN CALCIUM 80 MG/1
80 TABLET, FILM COATED ORAL NIGHTLY
Qty: 90 TABLET | Refills: 0 | Status: SHIPPED | OUTPATIENT
Start: 2018-12-19 | End: 2019-07-17 | Stop reason: SDUPTHER

## 2018-12-20 ENCOUNTER — RESULTS ENCOUNTER (OUTPATIENT)
Dept: INTERNAL MEDICINE | Facility: CLINIC | Age: 78
End: 2018-12-20

## 2018-12-20 DIAGNOSIS — E03.9 ACQUIRED HYPOTHYROIDISM: ICD-10-CM

## 2019-01-07 RX ORDER — LEVOTHYROXINE SODIUM 0.05 MG/1
50 TABLET ORAL DAILY
Qty: 90 TABLET | Refills: 0 | Status: SHIPPED | OUTPATIENT
Start: 2019-01-07 | End: 2019-04-01 | Stop reason: SDUPTHER

## 2019-01-15 LAB — TSH SERPL DL<=0.005 MIU/L-ACNC: 3.14 UIU/ML (ref 0.45–4.5)

## 2019-01-21 RX ORDER — MONTELUKAST SODIUM 10 MG/1
10 TABLET ORAL NIGHTLY
Qty: 90 TABLET | Refills: 0 | Status: SHIPPED | OUTPATIENT
Start: 2019-01-21 | End: 2019-04-18 | Stop reason: SDUPTHER

## 2019-03-13 RX ORDER — DULOXETIN HYDROCHLORIDE 60 MG/1
60 CAPSULE, DELAYED RELEASE ORAL DAILY
Qty: 90 CAPSULE | Refills: 0 | Status: SHIPPED | OUTPATIENT
Start: 2019-03-13 | End: 2019-06-21 | Stop reason: SDUPTHER

## 2019-03-18 RX ORDER — LISINOPRIL 10 MG/1
10 TABLET ORAL DAILY
Qty: 90 TABLET | Refills: 0 | Status: SHIPPED | OUTPATIENT
Start: 2019-03-18 | End: 2019-06-19 | Stop reason: SDUPTHER

## 2019-04-01 RX ORDER — LEVOTHYROXINE SODIUM 0.05 MG/1
50 TABLET ORAL DAILY
Qty: 90 TABLET | Refills: 0 | Status: SHIPPED | OUTPATIENT
Start: 2019-04-01 | End: 2019-08-26 | Stop reason: SDUPTHER

## 2019-04-19 RX ORDER — MONTELUKAST SODIUM 10 MG/1
10 TABLET ORAL NIGHTLY
Qty: 90 TABLET | Refills: 0 | Status: SHIPPED | OUTPATIENT
Start: 2019-04-19 | End: 2019-06-21 | Stop reason: SDUPTHER

## 2019-04-22 RX ORDER — FLUTICASONE PROPIONATE 50 MCG
SPRAY, SUSPENSION (ML) NASAL
Qty: 48 ML | Refills: 0 | Status: SHIPPED | OUTPATIENT
Start: 2019-04-22 | End: 2019-08-03 | Stop reason: SDUPTHER

## 2019-05-30 ENCOUNTER — HOSPITAL ENCOUNTER (OUTPATIENT)
Dept: CT IMAGING | Facility: HOSPITAL | Age: 79
Discharge: HOME | End: 2019-05-30
Admitting: FAMILY MEDICINE

## 2019-05-30 DIAGNOSIS — R93.0 ABNORMAL MRI OF HEAD: ICD-10-CM

## 2019-05-30 PROCEDURE — 70450 CT HEAD/BRAIN W/O DYE: CPT

## 2019-06-19 RX ORDER — LISINOPRIL 10 MG/1
10 TABLET ORAL DAILY
Qty: 30 TABLET | Refills: 0 | Status: SHIPPED | OUTPATIENT
Start: 2019-06-19 | End: 2019-07-24 | Stop reason: SDUPTHER

## 2019-06-21 RX ORDER — DULOXETIN HYDROCHLORIDE 60 MG/1
60 CAPSULE, DELAYED RELEASE ORAL DAILY
Qty: 30 CAPSULE | Refills: 0 | Status: SHIPPED | OUTPATIENT
Start: 2019-06-21 | End: 2019-07-24 | Stop reason: SDUPTHER

## 2019-06-21 RX ORDER — MONTELUKAST SODIUM 10 MG/1
10 TABLET ORAL NIGHTLY
Qty: 30 TABLET | Refills: 0 | Status: SHIPPED | OUTPATIENT
Start: 2019-06-21 | End: 2019-07-24 | Stop reason: SDUPTHER

## 2019-07-08 RX ORDER — ATORVASTATIN CALCIUM 80 MG/1
80 TABLET, FILM COATED ORAL NIGHTLY
Qty: 90 TABLET | Refills: 0 | OUTPATIENT
Start: 2019-07-08

## 2019-07-17 RX ORDER — ATORVASTATIN CALCIUM 80 MG/1
80 TABLET, FILM COATED ORAL NIGHTLY
Qty: 90 TABLET | Refills: 0 | Status: SHIPPED | OUTPATIENT
Start: 2019-07-17 | End: 2019-08-26 | Stop reason: SDUPTHER

## 2019-07-25 RX ORDER — LISINOPRIL 10 MG/1
10 TABLET ORAL DAILY
Qty: 30 TABLET | Refills: 0 | Status: SHIPPED | OUTPATIENT
Start: 2019-07-25 | End: 2019-08-19 | Stop reason: SDUPTHER

## 2019-07-25 RX ORDER — MONTELUKAST SODIUM 10 MG/1
10 TABLET ORAL NIGHTLY
Qty: 30 TABLET | Refills: 0 | Status: SHIPPED | OUTPATIENT
Start: 2019-07-25 | End: 2019-08-19 | Stop reason: SDUPTHER

## 2019-07-25 RX ORDER — DULOXETIN HYDROCHLORIDE 60 MG/1
60 CAPSULE, DELAYED RELEASE ORAL DAILY
Qty: 30 CAPSULE | Refills: 0 | Status: SHIPPED | OUTPATIENT
Start: 2019-07-25 | End: 2019-08-19 | Stop reason: SDUPTHER

## 2019-08-05 RX ORDER — FLUTICASONE PROPIONATE 50 MCG
SPRAY, SUSPENSION (ML) NASAL
Qty: 48 ML | Refills: 0 | Status: SHIPPED | OUTPATIENT
Start: 2019-08-05 | End: 2019-11-01 | Stop reason: SDUPTHER

## 2019-08-20 RX ORDER — MONTELUKAST SODIUM 10 MG/1
10 TABLET ORAL NIGHTLY
Qty: 30 TABLET | Refills: 0 | Status: SHIPPED | OUTPATIENT
Start: 2019-08-20 | End: 2019-08-26 | Stop reason: SDUPTHER

## 2019-08-20 RX ORDER — DULOXETIN HYDROCHLORIDE 60 MG/1
60 CAPSULE, DELAYED RELEASE ORAL DAILY
Qty: 30 CAPSULE | Refills: 0 | Status: SHIPPED | OUTPATIENT
Start: 2019-08-20 | End: 2019-08-26 | Stop reason: SDUPTHER

## 2019-08-20 RX ORDER — LISINOPRIL 10 MG/1
10 TABLET ORAL DAILY
Qty: 30 TABLET | Refills: 0 | Status: SHIPPED | OUTPATIENT
Start: 2019-08-20 | End: 2019-08-26 | Stop reason: SDUPTHER

## 2019-08-26 ENCOUNTER — OFFICE VISIT (OUTPATIENT)
Dept: INTERNAL MEDICINE | Facility: CLINIC | Age: 79
End: 2019-08-26

## 2019-08-26 VITALS
WEIGHT: 153 LBS | SYSTOLIC BLOOD PRESSURE: 130 MMHG | TEMPERATURE: 98.4 F | DIASTOLIC BLOOD PRESSURE: 60 MMHG | HEIGHT: 67 IN | BODY MASS INDEX: 24.01 KG/M2 | OXYGEN SATURATION: 98 % | HEART RATE: 97 BPM

## 2019-08-26 DIAGNOSIS — E03.9 ACQUIRED HYPOTHYROIDISM: ICD-10-CM

## 2019-08-26 DIAGNOSIS — Z00.00 MEDICARE ANNUAL WELLNESS VISIT, SUBSEQUENT: ICD-10-CM

## 2019-08-26 DIAGNOSIS — I10 ESSENTIAL HYPERTENSION: Primary | ICD-10-CM

## 2019-08-26 DIAGNOSIS — F41.1 GENERALIZED ANXIETY DISORDER: ICD-10-CM

## 2019-08-26 DIAGNOSIS — E55.9 VITAMIN D DEFICIENCY: ICD-10-CM

## 2019-08-26 DIAGNOSIS — E78.00 HYPERCHOLESTEROLEMIA: ICD-10-CM

## 2019-08-26 DIAGNOSIS — Z78.0 POSTMENOPAUSAL: ICD-10-CM

## 2019-08-26 PROCEDURE — G0439 PPPS, SUBSEQ VISIT: HCPCS | Performed by: FAMILY MEDICINE

## 2019-08-26 PROCEDURE — 99214 OFFICE O/P EST MOD 30 MIN: CPT | Performed by: FAMILY MEDICINE

## 2019-08-26 RX ORDER — MONTELUKAST SODIUM 10 MG/1
10 TABLET ORAL NIGHTLY
Qty: 90 TABLET | Refills: 1 | Status: SHIPPED | OUTPATIENT
Start: 2019-08-26 | End: 2020-04-02

## 2019-08-26 RX ORDER — DULOXETIN HYDROCHLORIDE 60 MG/1
60 CAPSULE, DELAYED RELEASE ORAL DAILY
Qty: 90 CAPSULE | Refills: 1 | Status: SHIPPED | OUTPATIENT
Start: 2019-08-26 | End: 2020-04-02

## 2019-08-26 RX ORDER — LISINOPRIL 10 MG/1
10 TABLET ORAL DAILY
Qty: 90 TABLET | Refills: 1 | Status: SHIPPED | OUTPATIENT
Start: 2019-08-26 | End: 2020-04-02

## 2019-08-26 RX ORDER — LEVOTHYROXINE SODIUM 0.05 MG/1
50 TABLET ORAL DAILY
Qty: 90 TABLET | Refills: 1 | Status: SHIPPED | OUTPATIENT
Start: 2019-08-26 | End: 2020-03-18

## 2019-08-26 RX ORDER — ATORVASTATIN CALCIUM 80 MG/1
80 TABLET, FILM COATED ORAL NIGHTLY
Qty: 90 TABLET | Refills: 1 | Status: SHIPPED | OUTPATIENT
Start: 2019-08-26 | End: 2020-04-02

## 2019-08-26 NOTE — PATIENT INSTRUCTIONS
Fall Prevention in the Home, Adult  Falls can cause injuries. They can happen to people of all ages. There are many things you can do to make your home safe and to help prevent falls. Ask for help when making these changes, if needed.  What actions can I take to prevent falls?  General Instructions  · Use good lighting in all rooms. Replace any light bulbs that burn out.  · Turn on the lights when you go into a dark area. Use night-lights.  · Keep items that you use often in easy-to-reach places. Lower the shelves around your home if necessary.  · Set up your furniture so you have a clear path. Avoid moving your furniture around.  · Do not have throw rugs and other things on the floor that can make you trip.  · Avoid walking on wet floors.  · If any of your floors are uneven, fix them.  · Add color or contrast paint or tape to clearly hannah and help you see:  ? Any grab bars or handrails.  ? First and last steps of stairways.  ? Where the edge of each step is.  · If you use a stepladder:  ? Make sure that it is fully opened. Do not climb a closed stepladder.  ? Make sure that both sides of the stepladder are locked into place.  ? Ask someone to hold the stepladder for you while you use it.  · If there are any pets around you, be aware of where they are.  What can I do in the bathroom?    · Keep the floor dry. Clean up any water that spills onto the floor as soon as it happens.  · Remove soap buildup in the tub or shower regularly.  · Use non-skid mats or decals on the floor of the tub or shower.  · Attach bath mats securely with double-sided, non-slip rug tape.  · If you need to sit down in the shower, use a plastic, non-slip stool.  · Install grab bars by the toilet and in the tub and shower. Do not use towel bars as grab bars.  What can I do in the bedroom?  · Make sure that you have a light by your bed that is easy to reach.  · Do not use any sheets or blankets that are too big for your bed. They should not hang  down onto the floor.  · Have a firm chair that has side arms. You can use this for support while you get dressed.  What can I do in the kitchen?  · Clean up any spills right away.  · If you need to reach something above you, use a strong step stool that has a grab bar.  · Keep electrical cords out of the way.  · Do not use floor polish or wax that makes floors slippery. If you must use wax, use non-skid floor wax.  What can I do with my stairs?  · Do not leave any items on the stairs.  · Make sure that you have a light switch at the top of the stairs and the bottom of the stairs. If you do not have them, ask someone to add them for you.  · Make sure that there are handrails on both sides of the stairs, and use them. Fix handrails that are broken or loose. Make sure that handrails are as long as the stairways.  · Install non-slip stair treads on all stairs in your home.  · Avoid having throw rugs at the top or bottom of the stairs. If you do have throw rugs, attach them to the floor with carpet tape.  · Choose a carpet that does not hide the edge of the steps on the stairway.  · Check any carpeting to make sure that it is firmly attached to the stairs. Fix any carpet that is loose or worn.  What can I do on the outside of my home?  · Use bright outdoor lighting.  · Regularly fix the edges of walkways and driveways and fix any cracks.  · Remove anything that might make you trip as you walk through a door, such as a raised step or threshold.  · Trim any bushes or trees on the path to your home.  · Regularly check to see if handrails are loose or broken. Make sure that both sides of any steps have handrails.  · Install guardrails along the edges of any raised decks and porches.  · Clear walking paths of anything that might make someone trip, such as tools or rocks.  · Have any leaves, snow, or ice cleared regularly.  · Use sand or salt on walking paths during winter.  · Clean up any spills in your garage right away.  This includes grease or oil spills.  What other actions can I take?  · Wear shoes that:  ? Have a low heel. Do not wear high heels.  ? Have rubber bottoms.  ? Are comfortable and fit you well.  ? Are closed at the toe. Do not wear open-toe sandals.  · Use tools that help you move around (mobility aids) if they are needed. These include:  ? Canes.  ? Walkers.  ? Scooters.  ? Crutches.  · Review your medicines with your doctor. Some medicines can make you feel dizzy. This can increase your chance of falling.  Ask your doctor what other things you can do to help prevent falls.  Where to find more information  · Centers for Disease Control and Prevention, STEAD[employer]: https://cdc.gov  · National [place] on Aging: https://tj1kacw.missy.nih.gov  Contact a doctor if:  · You are afraid of falling at home.  · You feel weak, drowsy, or dizzy at home.  · You fall at home.  Summary  · There are many simple things that you can do to make your home safe and to help prevent falls.  · Ways to make your home safe include removing tripping hazards and installing grab bars in the bathroom.  · Ask for help when making these changes in your home.  This information is not intended to replace advice given to you by your health care provider. Make sure you discuss any questions you have with your health care provider.  Document Released: 10/14/2010 Document Revised: 2018 Document Reviewed: 2018  [employer] Interactive Patient Education © 2019 [employer]    Medicare Wellness  Personal Prevention Plan of Service     Date of Office Visit:  2019  Encounter Provider:  Ekaterina Santamaria MD  Place of Service:  [facility]  Patient Name: Joanna Ferris  :  1940    As part of the Medicare Wellness portion of your visit today, we are providing you with this personalized preventive plan of services (PPPS). This plan is based upon recommendations of the [place] Preventive Services Task Force  (USPSTF) and the Advisory Committee on Immunization Practices (ACIP).    This lists the preventive care services that should be considered, and provides dates of when you are due. Items listed as completed are up-to-date and do not require any further intervention.    Health Maintenance   Topic Date Due   • MEDICARE ANNUAL WELLNESS  07/25/2016   • DXA SCAN  03/20/2019   • INFLUENZA VACCINE  08/01/2019   • ZOSTER VACCINE (2 of 2) 09/03/2020 (Originally 7/27/2013)   • TDAP/TD VACCINES (2 - Td) 09/28/2020 (Originally 1/1/2013)   • MAMMOGRAM  09/03/2019   • LIPID PANEL  11/10/2019   • COLONOSCOPY  06/01/2020   • PNEUMOCOCCAL VACCINES (65+ LOW/MEDIUM RISK)  Completed       Orders Placed This Encounter   Procedures   • DEXA Bone Density Axial     Standing Status:   Future     Standing Expiration Date:   8/26/2020     Order Specific Question:   Reason for Exam:     Answer:   postmenopausal   • Comprehensive Metabolic Panel   • Lipid Panel With LDL / HDL Ratio   • Thyroid Cascade Profile   • Vitamin D 25 Hydroxy       Return in about 6 months (around 2/26/2020).

## 2019-08-26 NOTE — PROGRESS NOTES
Subjective   Joanna Ferris is a 78 y.o. female.   Chief Complaint   Patient presents with   • Medicare Wellness-subsequent   • Hypertension   • Hyperlipidemia   • Anxiety       History of Present Illness     #1 hypertension- patient is on lisinopril 20 mg a day. She takes it everyday.  She has no side effects.  She has no chest pain, no shortness of breath, no lightheadedness.  She exercises daily.  She walks, she rides a bike, she does rowing.  She exercises for at least an hour a day plus walking.     #2 hypercholesterolemia- patient is Lipitor 80 mg a day. She takes it everyday. No muscle aches, she has occasional muscle cramps.      #3 arthritis/anxiety-it is localized in hands and knees. On Cymbalta 60 mg a day- It helps with anxiety and arthritis.  It controls her symptoms.  It was started in 2015.  She tried prior to that other medications but none of them worked as well as Cymbalta. Anxiety is controlled.  She does not have excessive worries.  Her mood is normal.  In the past she was treated by rheumatologist.     #4  Hypothyroidism- patient is on levothyroxine at 50 mcg a day.  She takes it every day.  She takes on empty stomach and does not eat for at least an hour after taking it.    The following portions of the patient's history were reviewed and updated as appropriate: allergies, current medications, past family history, past medical history, past social history, past surgical history and problem list.    Review of Systems   Constitutional: Negative.    Respiratory: Negative.    Cardiovascular: Negative.    Psychiatric/Behavioral: Negative.          Objective   Wt Readings from Last 3 Encounters:   08/26/19 69.4 kg (153 lb)   11/20/18 71.2 kg (157 lb)   11/10/18 73 kg (161 lb)      Vitals:    08/26/19 1308   BP: 130/60   Pulse: 97   Temp: 98.4 °F (36.9 °C)   SpO2: 98%     Temp Readings from Last 3 Encounters:   08/26/19 98.4 °F (36.9 °C)   11/20/18 98 °F (36.7 °C)   11/10/18 98.2 °F (36.8 °C) (Oral)      BP Readings from Last 3 Encounters:   08/26/19 130/60   11/20/18 120/80   11/10/18 126/71     Pulse Readings from Last 3 Encounters:   08/26/19 97   11/20/18 80   11/10/18 61       Physical Exam   Constitutional: She is oriented to person, place, and time. She appears well-developed and well-nourished.   HENT:   Head: Normocephalic and atraumatic.   Neck: Neck supple. Carotid bruit is not present. No thyromegaly present.   Cardiovascular: Normal rate, regular rhythm and normal heart sounds.   Pulmonary/Chest: Effort normal and breath sounds normal.   Neurological: She is alert and oriented to person, place, and time.   Skin: Skin is warm, dry and intact.   Psychiatric: She has a normal mood and affect. Her behavior is normal.       Assessment/Plan   Joanna was seen today for medicare wellness-subsequent, hypertension, hyperlipidemia, anxiety and hypothyroidism.    Diagnoses and all orders for this visit:    Essential hypertension  -     Comprehensive Metabolic Panel  -     Lipid Panel With LDL / HDL Ratio    Hypercholesterolemia  -     Comprehensive Metabolic Panel  -     Lipid Panel With LDL / HDL Ratio    Acquired hypothyroidism  -     Thyroid Cascade Profile    Vitamin D deficiency  -     Vitamin D 25 Hydroxy    Generalized anxiety disorder    Medicare annual wellness visit, subsequent    Postmenopausal  -     DEXA Bone Density Axial; Future    Other orders  -     lisinopril (PRINIVIL,ZESTRIL) 10 MG tablet; Take 1 tablet by mouth Daily.  -     montelukast (SINGULAIR) 10 MG tablet; Take 1 tablet by mouth Every Night.  -     levothyroxine (SYNTHROID, LEVOTHROID) 50 MCG tablet; Take 1 tablet by mouth Daily.  -     DULoxetine (CYMBALTA) 60 MG capsule; Take 1 capsule by mouth Daily.  -     atorvastatin (LIPITOR) 80 MG tablet; Take 1 tablet by mouth Every Night.        #1 HTN-continue current treatment.  Check labs.  Follow-up in 6 months.  2.  Hyperlipidemia-continue same.  Follow-up in 6 months.  3.  Anxiety/#4  arthritis-we will continue Cymbalta.  Follow-up in 6 months.  5.  Hypothyroidism-check labs.  Continue current treatment.  Follow-up in 12 months.

## 2019-08-26 NOTE — PROGRESS NOTES
The ABCs of the Annual Wellness Visit  Subsequent Medicare Wellness Visit    Chief Complaint   Patient presents with   • Medicare Wellness-subsequent   • Hypertension   • Hyperlipidemia   • Anxiety       Subjective   History of Present Illness:  Joanna Ferris is a 78 y.o. female who presents for a Subsequent Medicare Wellness Visit.    HEALTH RISK ASSESSMENT    Recent Hospitalizations:  No hospitalization(s) within the last year.    Current Medical Providers:  Patient Care Team:  Ekaterina Santamaria MD as PCP - General  Ekaterina Santamaria MD as PCP - Claims Attributed    Smoking Status:  Social History     Tobacco Use   Smoking Status Former Smoker   Smokeless Tobacco Never Used       Alcohol Consumption:  Social History     Substance and Sexual Activity   Alcohol Use Yes   • Alcohol/week: 4.2 oz   • Types: 7 Shots of liquor per week       Depression Screen:   PHQ-2/PHQ-9 Depression Screening 8/26/2019   Little interest or pleasure in doing things 0   Feeling down, depressed, or hopeless 0   Trouble falling or staying asleep, or sleeping too much 0   Feeling tired or having little energy 0   Poor appetite or overeating 0   Feeling bad about yourself - or that you are a failure or have let yourself or your family down 0   Trouble concentrating on things, such as reading the newspaper or watching television 0   Moving or speaking so slowly that other people could have noticed. Or the opposite - being so fidgety or restless that you have been moving around a lot more than usual 0   Thoughts that you would be better off dead, or of hurting yourself in some way 0   Total Score 0   If you checked off any problems, how difficult have these problems made it for you to do your work, take care of things at home, or get along with other people? Not difficult at all       Fall Risk Screen:  STEADI Fall Risk Assessment was completed, and patient is at MODERATE risk for falls. Assessment completed on:8/26/2019    Health Habits and  Functional and Cognitive Screening:  Functional & Cognitive Status 8/26/2019   Do you have difficulty preparing food and eating? No   Do you have difficulty bathing yourself, getting dressed or grooming yourself? No   Do you have difficulty using the toilet? No   Do you have difficulty moving around from place to place? No   Do you have trouble with steps or getting out of a bed or a chair? No   Current Diet Well Balanced Diet   Dental Exam Up to date   Eye Exam Up to date   Exercise (times per week) 7 times per week   Current Exercise Activities Include Walking   Do you need help using the phone?  No   Are you deaf or do you have serious difficulty hearing?  No   Do you need help with transportation? No   Do you need help shopping? No   Do you need help preparing meals?  No   Do you need help with housework?  No   Do you need help with laundry? No   Do you need help taking your medications? No   Do you need help managing money? No   Do you ever drive or ride in a car without wearing a seat belt? No   Have you felt unusual stress, anger or loneliness in the last month? No   Who do you live with? Spouse   If you need help, do you have trouble finding someone available to you? No   Have you been bothered in the last four weeks by sexual problems? No   Do you have difficulty concentrating, remembering or making decisions? No         Does the patient have evidence of cognitive impairment? No    Asprin use counseling:Does not need ASA but is currently taking (advised patient that ASA is not indicated and patient chooses to stop it)   Patient has no coronary artery disease, no history of stroke, no known prior peripheral vascular disease.    Age-appropriate Screening Schedule:  Refer to the list below for future screening recommendations based on patient's age, sex and/or medical conditions. Orders for these recommended tests are listed in the plan section. The patient has been provided with a written plan.    Health  Maintenance   Topic Date Due   • DXA SCAN  03/20/2019   • INFLUENZA VACCINE  08/01/2019   • ZOSTER VACCINE (2 of 2) 09/03/2020 (Originally 7/27/2013)   • TDAP/TD VACCINES (2 - Td) 09/28/2020 (Originally 1/1/2013)   • MAMMOGRAM  09/03/2019   • LIPID PANEL  11/10/2019   • COLONOSCOPY  06/01/2020   • PNEUMOCOCCAL VACCINES (65+ LOW/MEDIUM RISK)  Completed          The following portions of the patient's history were reviewed and updated as appropriate: allergies, current medications, past family history, past medical history, past social history, past surgical history and problem list.    Outpatient Medications Prior to Visit   Medication Sig Dispense Refill   • acetaminophen (TYLENOL) 325 MG tablet Take 2 tablets by mouth Every 6 (Six) Hours As Needed for Mild Pain .     • aspirin 81 MG tablet Take  by mouth daily.     • azelastine (ASTEPRO) 0.15 % solution nasal spray 1 spray into each nostril 2 (Two) Times a Day. 30 mL 5   • Calcium Citrate-Vitamin D (CALCIUM + D PO) Take  by mouth daily.     • Cholecalciferol (VITAMIN D3) 2000 UNITS tablet Take 5,000 Units by mouth Daily.     • Coenzyme Q10 (CO Q10) 100 MG capsule Take 1 capsule by mouth Daily.     • fluticasone (FLONASE) 50 MCG/ACT nasal spray INSTILL 2 SPRAYS IN EACH NOSTRIL DAILY 48 mL 0   • Lactobacillus (PROBIOTIC ACIDOPHILUS PO) Take 4 capsules by mouth Daily.     • Naphazoline-Pheniramine (OPCON-A OP) Apply 2 drops to eye(s) as directed by provider Daily.     • atorvastatin (LIPITOR) 80 MG tablet Take 1 tablet by mouth Every Night. 90 tablet 0   • DULoxetine (CYMBALTA) 60 MG capsule TAKE 1 CAPSULE BY MOUTH DAILY 30 capsule 0   • levothyroxine (SYNTHROID, LEVOTHROID) 50 MCG tablet TAKE 1 TABLET BY MOUTH DAILY 90 tablet 0   • lisinopril (PRINIVIL,ZESTRIL) 10 MG tablet TAKE 1 TABLET BY MOUTH DAILY 30 tablet 0   • montelukast (SINGULAIR) 10 MG tablet TAKE 1 TABLET BY MOUTH EVERY NIGHT 30 tablet 0   • benzonatate (TESSALON PERLES) 100 MG capsule Take 1 capsule by  "mouth 3 (Three) Times a Day As Needed for Cough. 30 capsule 0     No facility-administered medications prior to visit.        Patient Active Problem List   Diagnosis   • CKD (chronic kidney disease) stage 3, GFR 30-59 ml/min (CMS/[facility])   • Allergic conjunctivitis   • Atopic rhinitis   • Arthritis   • Depression   • Generalized anxiety disorder   • Hypercholesterolemia   • Hypertension   • Hypothyroidism   • Osteopenia   • Vitamin D deficiency   • Hypertensive encephalopathy       Advanced Care Planning:  Patient has an advance directive - a copy has been provided and is visible in patient header    Review of Systems    Compared to one year ago, the patient feels her physical health is better.  Compared to one year ago, the patient feels her mental health is better.    Reviewed chart for potential of high risk medication in the elderly: yes  Reviewed chart for potential of harmful drug interactions in the elderly:yes    Objective         Vitals:    08/26/19 1308   BP: 130/60   Pulse: 97   Temp: 98.4 °F (36.9 °C)   SpO2: 98%   Weight: 69.4 kg (153 lb)   Height: 170.2 cm (67.01\")   PainSc: 10-Worst pain ever   PainLoc: Head       Body mass index is 23.96 kg/m².  Discussed the patient's BMI with her. The BMI is in the acceptable range.    Physical Exam          Assessment/Plan   Medicare Risks and Personalized Health Plan  CMS Preventative Services Quick Reference  Cardiovascular risk  Fall Risk  Immunizations Discussed/Encouraged (specific immunizations; adacel Tdap and Shingrix )    The above risks/problems have been discussed with the patient.  Pertinent information has been shared with the patient in the After Visit Summary.  Follow up plans and orders are seen below in the Assessment/Plan Section.    She is doing great job with exercise.  She will get Shingrix at the pharmacy when available.  She is due for tetanus vaccine and is going to get it at the pharmacy.  Information on fall prevention provided.    Diagnoses " and all orders for this visit:    1. Essential hypertension (Primary)  -     Comprehensive Metabolic Panel  -     Lipid Panel With LDL / HDL Ratio    2. Hypercholesterolemia  -     Comprehensive Metabolic Panel  -     Lipid Panel With LDL / HDL Ratio    3. Acquired hypothyroidism  -     Thyroid Cascade Profile    4. Vitamin D deficiency  -     Vitamin D 25 Hydroxy    5. Generalized anxiety disorder    6. Medicare annual wellness visit, subsequent    7. Postmenopausal  -     DEXA Bone Density Axial; Future    Other orders  -     lisinopril (PRINIVIL,ZESTRIL) 10 MG tablet; Take 1 tablet by mouth Daily.  Dispense: 90 tablet; Refill: 1  -     montelukast (SINGULAIR) 10 MG tablet; Take 1 tablet by mouth Every Night.  Dispense: 90 tablet; Refill: 1  -     levothyroxine (SYNTHROID, LEVOTHROID) 50 MCG tablet; Take 1 tablet by mouth Daily.  Dispense: 90 tablet; Refill: 1  -     DULoxetine (CYMBALTA) 60 MG capsule; Take 1 capsule by mouth Daily.  Dispense: 90 capsule; Refill: 1  -     atorvastatin (LIPITOR) 80 MG tablet; Take 1 tablet by mouth Every Night.  Dispense: 90 tablet; Refill: 1      Follow Up:  Return in about 6 months (around 2/26/2020).     An After Visit Summary and PPPS were given to the patient.

## 2019-09-06 LAB
25(OH)D3+25(OH)D2 SERPL-MCNC: 40 NG/ML (ref 30–100)
ALBUMIN SERPL-MCNC: 4.1 G/DL (ref 3.5–4.8)
ALBUMIN/GLOB SERPL: 2.1 {RATIO} (ref 1.2–2.2)
ALP SERPL-CCNC: 74 IU/L (ref 39–117)
ALT SERPL-CCNC: 27 IU/L (ref 0–32)
AST SERPL-CCNC: 29 IU/L (ref 0–40)
BILIRUB SERPL-MCNC: 0.6 MG/DL (ref 0–1.2)
BUN SERPL-MCNC: 16 MG/DL (ref 8–27)
BUN/CREAT SERPL: 15 (ref 12–28)
CALCIUM SERPL-MCNC: 9.5 MG/DL (ref 8.7–10.3)
CHLORIDE SERPL-SCNC: 105 MMOL/L (ref 96–106)
CHOLEST SERPL-MCNC: 219 MG/DL (ref 100–199)
CO2 SERPL-SCNC: 26 MMOL/L (ref 20–29)
CREAT SERPL-MCNC: 1.07 MG/DL (ref 0.57–1)
GLOBULIN SER CALC-MCNC: 2 G/DL (ref 1.5–4.5)
GLUCOSE SERPL-MCNC: 95 MG/DL (ref 65–99)
HDLC SERPL-MCNC: 76 MG/DL
LDLC SERPL CALC-MCNC: 125 MG/DL (ref 0–99)
LDLC/HDLC SERPL: 1.6 RATIO (ref 0–3.2)
POTASSIUM SERPL-SCNC: 5.2 MMOL/L (ref 3.5–5.2)
PROT SERPL-MCNC: 6.1 G/DL (ref 6–8.5)
SODIUM SERPL-SCNC: 143 MMOL/L (ref 134–144)
TRIGL SERPL-MCNC: 92 MG/DL (ref 0–149)
TSH SERPL DL<=0.005 MIU/L-ACNC: 2.11 UIU/ML (ref 0.45–4.5)
VLDLC SERPL CALC-MCNC: 18 MG/DL (ref 5–40)

## 2019-10-10 ENCOUNTER — HOSPITAL ENCOUNTER (OUTPATIENT)
Dept: BONE DENSITY | Facility: HOSPITAL | Age: 79
Discharge: HOME | End: 2019-10-10
Admitting: FAMILY MEDICINE

## 2019-10-10 DIAGNOSIS — Z78.0 POSTMENOPAUSAL: ICD-10-CM

## 2019-10-10 PROCEDURE — 77080 DXA BONE DENSITY AXIAL: CPT

## 2019-11-01 RX ORDER — FLUTICASONE PROPIONATE 50 MCG
SPRAY, SUSPENSION (ML) NASAL
Qty: 48 ML | Refills: 5 | Status: SHIPPED | OUTPATIENT
Start: 2019-11-01 | End: 2020-09-08 | Stop reason: SDUPTHER

## 2019-11-04 ENCOUNTER — APPOINTMENT (OUTPATIENT)
Dept: WOMENS IMAGING | Facility: HOSPITAL | Age: 79
End: 2019-11-04

## 2019-11-04 PROCEDURE — 77063 BREAST TOMOSYNTHESIS BI: CPT | Performed by: RADIOLOGY

## 2019-11-04 PROCEDURE — 77067 SCR MAMMO BI INCL CAD: CPT | Performed by: RADIOLOGY

## 2020-03-18 RX ORDER — LEVOTHYROXINE SODIUM 0.05 MG/1
50 TABLET ORAL DAILY
Qty: 90 TABLET | Refills: 0 | Status: SHIPPED | OUTPATIENT
Start: 2020-03-18 | End: 2020-06-17

## 2020-04-02 RX ORDER — LISINOPRIL 10 MG/1
10 TABLET ORAL DAILY
Qty: 90 TABLET | Refills: 0 | Status: SHIPPED | OUTPATIENT
Start: 2020-04-02 | End: 2020-07-01

## 2020-04-02 RX ORDER — DULOXETIN HYDROCHLORIDE 60 MG/1
60 CAPSULE, DELAYED RELEASE ORAL DAILY
Qty: 90 CAPSULE | Refills: 0 | Status: SHIPPED | OUTPATIENT
Start: 2020-04-02 | End: 2020-07-01

## 2020-04-02 RX ORDER — ATORVASTATIN CALCIUM 80 MG/1
80 TABLET, FILM COATED ORAL NIGHTLY
Qty: 90 TABLET | Refills: 0 | Status: SHIPPED | OUTPATIENT
Start: 2020-04-02 | End: 2020-07-01

## 2020-04-02 RX ORDER — MONTELUKAST SODIUM 10 MG/1
10 TABLET ORAL NIGHTLY
Qty: 90 TABLET | Refills: 0 | Status: SHIPPED | OUTPATIENT
Start: 2020-04-02 | End: 2020-07-01

## 2020-06-17 RX ORDER — LEVOTHYROXINE SODIUM 0.05 MG/1
50 TABLET ORAL DAILY
Qty: 90 TABLET | Refills: 0 | Status: SHIPPED | OUTPATIENT
Start: 2020-06-17 | End: 2020-07-06

## 2020-07-01 RX ORDER — LISINOPRIL 10 MG/1
10 TABLET ORAL DAILY
Qty: 30 TABLET | Refills: 0 | Status: SHIPPED | OUTPATIENT
Start: 2020-07-01 | End: 2020-09-08 | Stop reason: SDUPTHER

## 2020-07-01 RX ORDER — DULOXETIN HYDROCHLORIDE 60 MG/1
60 CAPSULE, DELAYED RELEASE ORAL DAILY
Qty: 30 CAPSULE | Refills: 0 | Status: SHIPPED | OUTPATIENT
Start: 2020-07-01 | End: 2020-07-27

## 2020-07-01 RX ORDER — LISINOPRIL 10 MG/1
TABLET ORAL
Qty: 30 TABLET | Refills: 0 | Status: SHIPPED | OUTPATIENT
Start: 2020-07-01 | End: 2020-09-08 | Stop reason: SDUPTHER

## 2020-07-01 RX ORDER — ATORVASTATIN CALCIUM 80 MG/1
80 TABLET, FILM COATED ORAL NIGHTLY
Qty: 30 TABLET | Refills: 0 | Status: SHIPPED | OUTPATIENT
Start: 2020-07-01 | End: 2020-07-27

## 2020-07-01 RX ORDER — MONTELUKAST SODIUM 10 MG/1
10 TABLET ORAL NIGHTLY
Qty: 30 TABLET | Refills: 0 | Status: SHIPPED | OUTPATIENT
Start: 2020-07-01 | End: 2020-07-27

## 2020-07-06 RX ORDER — LEVOTHYROXINE SODIUM 0.05 MG/1
50 TABLET ORAL DAILY
Qty: 30 TABLET | Refills: 0 | Status: SHIPPED | OUTPATIENT
Start: 2020-07-06 | End: 2020-09-08 | Stop reason: SDUPTHER

## 2020-07-27 RX ORDER — DULOXETIN HYDROCHLORIDE 60 MG/1
60 CAPSULE, DELAYED RELEASE ORAL DAILY
Qty: 30 CAPSULE | Refills: 0 | Status: SHIPPED | OUTPATIENT
Start: 2020-07-27 | End: 2020-09-08 | Stop reason: SDUPTHER

## 2020-07-27 RX ORDER — MONTELUKAST SODIUM 10 MG/1
10 TABLET ORAL NIGHTLY
Qty: 30 TABLET | Refills: 0 | Status: SHIPPED | OUTPATIENT
Start: 2020-07-27 | End: 2020-09-08 | Stop reason: SDUPTHER

## 2020-07-27 RX ORDER — ATORVASTATIN CALCIUM 80 MG/1
80 TABLET, FILM COATED ORAL NIGHTLY
Qty: 30 TABLET | Refills: 0 | Status: SHIPPED | OUTPATIENT
Start: 2020-07-27 | End: 2020-09-08 | Stop reason: SDUPTHER

## 2020-08-24 RX ORDER — DULOXETIN HYDROCHLORIDE 60 MG/1
60 CAPSULE, DELAYED RELEASE ORAL DAILY
Qty: 30 CAPSULE | Refills: 0 | OUTPATIENT
Start: 2020-08-24

## 2020-08-24 RX ORDER — LISINOPRIL 10 MG/1
10 TABLET ORAL DAILY
Qty: 30 TABLET | Refills: 0 | OUTPATIENT
Start: 2020-08-24

## 2020-08-24 RX ORDER — MONTELUKAST SODIUM 10 MG/1
10 TABLET ORAL NIGHTLY
Qty: 30 TABLET | Refills: 0 | OUTPATIENT
Start: 2020-08-24

## 2020-08-24 RX ORDER — ATORVASTATIN CALCIUM 80 MG/1
80 TABLET, FILM COATED ORAL NIGHTLY
Qty: 30 TABLET | Refills: 0 | OUTPATIENT
Start: 2020-08-24

## 2020-08-28 DIAGNOSIS — I10 ESSENTIAL HYPERTENSION: Primary | ICD-10-CM

## 2020-08-28 DIAGNOSIS — E03.9 ACQUIRED HYPOTHYROIDISM: ICD-10-CM

## 2020-08-28 DIAGNOSIS — E78.00 HYPERCHOLESTEROLEMIA: ICD-10-CM

## 2020-09-01 LAB
ALBUMIN SERPL-MCNC: 4.4 G/DL (ref 3.5–5.2)
ALBUMIN/GLOB SERPL: 3.1 G/DL
ALP SERPL-CCNC: 53 U/L (ref 39–117)
ALT SERPL-CCNC: 23 U/L (ref 1–33)
AST SERPL-CCNC: 26 U/L (ref 1–32)
BILIRUB SERPL-MCNC: 0.7 MG/DL (ref 0–1.2)
BUN SERPL-MCNC: 15 MG/DL (ref 8–23)
BUN/CREAT SERPL: 15.3 (ref 7–25)
CALCIUM SERPL-MCNC: 9.3 MG/DL (ref 8.6–10.5)
CHLORIDE SERPL-SCNC: 104 MMOL/L (ref 98–107)
CHOLEST SERPL-MCNC: 193 MG/DL (ref 0–200)
CO2 SERPL-SCNC: 28.2 MMOL/L (ref 22–29)
CREAT SERPL-MCNC: 0.98 MG/DL (ref 0.57–1)
GLOBULIN SER CALC-MCNC: 1.4 GM/DL
GLUCOSE SERPL-MCNC: 91 MG/DL (ref 65–99)
HCV AB S/CO SERPL IA: <0.1 S/CO RATIO (ref 0–0.9)
HDLC SERPL-MCNC: 75 MG/DL (ref 40–60)
LDLC SERPL CALC-MCNC: 96 MG/DL (ref 0–100)
LDLC/HDLC SERPL: 1.28 {RATIO}
POTASSIUM SERPL-SCNC: 4.6 MMOL/L (ref 3.5–5.2)
PROT SERPL-MCNC: 5.8 G/DL (ref 6–8.5)
SODIUM SERPL-SCNC: 141 MMOL/L (ref 136–145)
TRIGL SERPL-MCNC: 109 MG/DL (ref 0–150)
TSH SERPL DL<=0.005 MIU/L-ACNC: 3.42 UIU/ML (ref 0.45–4.5)
VLDLC SERPL CALC-MCNC: 21.8 MG/DL

## 2020-09-08 ENCOUNTER — OFFICE VISIT (OUTPATIENT)
Dept: INTERNAL MEDICINE | Facility: CLINIC | Age: 80
End: 2020-09-08

## 2020-09-08 VITALS
TEMPERATURE: 98.2 F | WEIGHT: 156 LBS | HEIGHT: 65 IN | DIASTOLIC BLOOD PRESSURE: 64 MMHG | HEART RATE: 75 BPM | OXYGEN SATURATION: 98 % | SYSTOLIC BLOOD PRESSURE: 110 MMHG | BODY MASS INDEX: 25.99 KG/M2

## 2020-09-08 DIAGNOSIS — E78.00 HYPERCHOLESTEROLEMIA: ICD-10-CM

## 2020-09-08 DIAGNOSIS — J30.1 NON-SEASONAL ALLERGIC RHINITIS DUE TO POLLEN: ICD-10-CM

## 2020-09-08 DIAGNOSIS — Z12.11 SCREENING FOR COLON CANCER: Primary | ICD-10-CM

## 2020-09-08 DIAGNOSIS — I10 ESSENTIAL HYPERTENSION: ICD-10-CM

## 2020-09-08 DIAGNOSIS — F41.1 GENERALIZED ANXIETY DISORDER: ICD-10-CM

## 2020-09-08 DIAGNOSIS — E03.9 ACQUIRED HYPOTHYROIDISM: ICD-10-CM

## 2020-09-08 PROCEDURE — 99214 OFFICE O/P EST MOD 30 MIN: CPT | Performed by: FAMILY MEDICINE

## 2020-09-08 RX ORDER — DULOXETIN HYDROCHLORIDE 60 MG/1
60 CAPSULE, DELAYED RELEASE ORAL DAILY
Qty: 90 CAPSULE | Refills: 1 | Status: SHIPPED | OUTPATIENT
Start: 2020-09-08 | End: 2021-03-08

## 2020-09-08 RX ORDER — FLUTICASONE PROPIONATE 50 MCG
2 SPRAY, SUSPENSION (ML) NASAL DAILY
Qty: 48 ML | Refills: 5 | Status: SHIPPED | OUTPATIENT
Start: 2020-09-08 | End: 2020-11-18

## 2020-09-08 RX ORDER — LISINOPRIL 10 MG/1
10 TABLET ORAL DAILY
Qty: 90 TABLET | Refills: 1 | Status: SHIPPED | OUTPATIENT
Start: 2020-09-08 | End: 2021-03-08

## 2020-09-08 RX ORDER — LEVOTHYROXINE SODIUM 0.05 MG/1
50 TABLET ORAL DAILY
Qty: 90 TABLET | Refills: 1 | Status: SHIPPED | OUTPATIENT
Start: 2020-09-08 | End: 2021-03-08

## 2020-09-08 RX ORDER — AZELASTINE HCL 205.5 UG/1
1 SPRAY NASAL DAILY PRN
Qty: 30 ML | Refills: 5 | Status: SHIPPED | OUTPATIENT
Start: 2020-09-08 | End: 2021-04-29 | Stop reason: SDUPTHER

## 2020-09-08 RX ORDER — ATORVASTATIN CALCIUM 80 MG/1
80 TABLET, FILM COATED ORAL NIGHTLY
Qty: 90 TABLET | Refills: 1 | Status: SHIPPED | OUTPATIENT
Start: 2020-09-08 | End: 2021-03-08

## 2020-09-08 RX ORDER — MONTELUKAST SODIUM 10 MG/1
10 TABLET ORAL NIGHTLY
Qty: 90 TABLET | Refills: 1 | Status: SHIPPED | OUTPATIENT
Start: 2020-09-08 | End: 2021-03-08

## 2020-09-08 NOTE — PROGRESS NOTES
Subjective   Joanna Ferris is a 79 y.o. female.   Chief Complaint   Patient presents with   • Hypertension   • Hypothyroidism   • Hyperlipidemia   • Anxiety   • Memory Loss     short term memory       History of Present Illness     #1 hypertension- patient is on lisinopril 10 mg a day. She takes it everyday.  She has no side effects.  She has no chest pain, no shortness of breath, no lightheadedness.  She exercises daily.  She walks, she rides a bike, she does rowing.  She exercises for at least 30 to 60 minutes a day.  Kidney tests and electrolytes are stable.     #2 hypercholesterolemia- patient is Lipitor 80 mg a day. She takes it everyday. No muscle aches, she has occasional muscle cramps.  LDL 96, HDL 75, LFTs normal.      #3 arthritis/anxiety-it is localized in hands and knees. On Cymbalta 60 mg a day- It helps with anxiety and arthritis.  It controls her symptoms.  It was started in 2015.  She tried prior to that other medications but none of them worked as well as Cymbalta. Anxiety is controlled.  She does not have excessive worries.  Her mood is normal.  In the past she was treated by rheumatologist.  PHQ 9 at 2, JUAN 7 and 4.     #4  Hypothyroidism- patient is on levothyroxine at 50 mcg a day.  She takes it every day.  She takes on empty stomach and does not eat for at least an hour after taking it.  TSH at 3.4.    #5 allergic rhinitis- patient is on Flonase and Singulair.  No Astepro recently.  She uses it as needed.  She has postnasal drip and congestion over last few weeks.  No cough, no fever, no chills.  No other symptoms associated.    Her mother had Alzheimer's disease.  Her sister was recently started on medications for memory.  Patient is concerned about memory loss.  She did not notice any more memory problems then her friends in the same age have.  Sometimes she forgets a word, but is able to recall it later.  She does not forget about meetings, she does not get lost when she is driving, she  does not forget about having something on the oven.  She does not ask repetitively the same question.  She is good at multitasking.     The following portions of the patient's history were reviewed and updated as appropriate: allergies, current medications, past family history, past medical history, past social history, past surgical history and problem list.    Review of Systems   Constitutional: Negative for chills and fever.   HENT: Positive for congestion and postnasal drip.    Respiratory: Negative for cough and shortness of breath.    Cardiovascular: Negative for chest pain.   Musculoskeletal: Negative for myalgias.   Neurological: Negative for light-headedness.   Psychiatric/Behavioral: Negative.          Objective   Wt Readings from Last 3 Encounters:   09/08/20 70.8 kg (156 lb)   08/26/19 69.4 kg (153 lb)   11/20/18 71.2 kg (157 lb)      Vitals:    09/08/20 1143   BP: 110/64   Pulse: 75   Temp: 98.2 °F (36.8 °C)   SpO2: 98%     Temp Readings from Last 3 Encounters:   09/08/20 98.2 °F (36.8 °C)   08/26/19 98.4 °F (36.9 °C)   11/20/18 98 °F (36.7 °C)     BP Readings from Last 3 Encounters:   09/08/20 110/64   08/26/19 130/60   11/20/18 120/80     Pulse Readings from Last 3 Encounters:   09/08/20 75   08/26/19 97   11/20/18 80     Body mass index is 25.96 kg/m².    Physical Exam   Constitutional: She is oriented to person, place, and time. She appears well-developed and well-nourished.   HENT:   Head: Normocephalic and atraumatic.   Neck: Neck supple. Carotid bruit is not present. No thyromegaly present.   Cardiovascular: Normal rate, regular rhythm and normal heart sounds.   Pulmonary/Chest: Effort normal and breath sounds normal.   Neurological: She is alert and oriented to person, place, and time.   Skin: Skin is warm, dry and intact.   Psychiatric: She has a normal mood and affect. Her behavior is normal.       Assessment/Plan   Joanna was seen today for hypertension, hypothyroidism, hyperlipidemia,  anxiety, memory loss and allergic rhinitis.    Diagnoses and all orders for this visit:    Screening for colon cancer  -     Ambulatory Referral to Gastroenterology    Essential hypertension    Hypercholesterolemia    Acquired hypothyroidism    Non-seasonal allergic rhinitis due to pollen    Generalized anxiety disorder    Other orders  -     lisinopril (PRINIVIL,ZESTRIL) 10 MG tablet; Take 1 tablet by mouth Daily.  -     levothyroxine (SYNTHROID, LEVOTHROID) 50 MCG tablet; Take 1 tablet by mouth Daily.  -     DULoxetine (CYMBALTA) 60 MG capsule; Take 1 capsule by mouth Daily.  -     atorvastatin (LIPITOR) 80 MG tablet; Take 1 tablet by mouth Every Night.  -     fluticasone (FLONASE) 50 MCG/ACT nasal spray; 2 sprays by Each Nare route Daily.  -     azelastine (ASTEPRO) 0.15 % solution nasal spray; 1 spray into the nostril(s) as directed by provider Daily As Needed for Rhinitis.  -     montelukast (SINGULAIR) 10 MG tablet; Take 1 tablet by mouth Every Night.        #1 hypertension-continue current treatment.  Follow-up in 6 months.  2.  Hyperlipidemia-continue current treatment.  Follow-up in 6 months.  3.  JUAN/arthritis-continue current treatment.  Follow-up in 6 months.  4.  Hypothyroidism-continue same.  Follow-up in 12 months.  5.  Allergic rhinitis- continue current treatment.  Add Astepro.  Follow-up in 6 to 12 months.    Family history of Alzheimer's disease.  I discussed with patient referral to [facility] for memory testing to get a baseline, but she prefers to hold with it.  She will let me know if he changes her mind.

## 2020-11-18 RX ORDER — FLUTICASONE PROPIONATE 50 MCG
SPRAY, SUSPENSION (ML) NASAL
Qty: 48 G | Refills: 2 | Status: SHIPPED | OUTPATIENT
Start: 2020-11-18 | End: 2021-04-29 | Stop reason: SDUPTHER

## 2020-12-03 ENCOUNTER — TELEPHONE (OUTPATIENT)
Dept: GASTROENTEROLOGY | Facility: CLINIC | Age: 80
End: 2020-12-03

## 2021-03-08 RX ORDER — LISINOPRIL 10 MG/1
10 TABLET ORAL DAILY
Qty: 90 TABLET | Refills: 0 | Status: SHIPPED | OUTPATIENT
Start: 2021-03-08 | End: 2021-04-29 | Stop reason: SDUPTHER

## 2021-03-08 RX ORDER — ATORVASTATIN CALCIUM 80 MG/1
80 TABLET, FILM COATED ORAL NIGHTLY
Qty: 90 TABLET | Refills: 0 | Status: SHIPPED | OUTPATIENT
Start: 2021-03-08 | End: 2021-04-29 | Stop reason: SDUPTHER

## 2021-03-08 RX ORDER — DULOXETIN HYDROCHLORIDE 60 MG/1
60 CAPSULE, DELAYED RELEASE ORAL DAILY
Qty: 90 CAPSULE | Refills: 0 | Status: SHIPPED | OUTPATIENT
Start: 2021-03-08 | End: 2021-04-29 | Stop reason: SDUPTHER

## 2021-03-08 RX ORDER — MONTELUKAST SODIUM 10 MG/1
10 TABLET ORAL NIGHTLY
Qty: 90 TABLET | Refills: 0 | Status: SHIPPED | OUTPATIENT
Start: 2021-03-08 | End: 2021-04-29 | Stop reason: SDUPTHER

## 2021-03-08 RX ORDER — LEVOTHYROXINE SODIUM 0.05 MG/1
50 TABLET ORAL DAILY
Qty: 90 TABLET | Refills: 1 | Status: SHIPPED | OUTPATIENT
Start: 2021-03-08 | End: 2021-04-29 | Stop reason: SDUPTHER

## 2021-03-15 ENCOUNTER — BULK ORDERING (OUTPATIENT)
Dept: CASE MANAGEMENT | Facility: OTHER | Age: 81
End: 2021-03-15

## 2021-03-15 DIAGNOSIS — Z23 IMMUNIZATION DUE: ICD-10-CM

## 2021-04-01 DIAGNOSIS — E03.9 ACQUIRED HYPOTHYROIDISM: ICD-10-CM

## 2021-04-01 DIAGNOSIS — I10 ESSENTIAL HYPERTENSION: Primary | ICD-10-CM

## 2021-04-01 DIAGNOSIS — E78.00 HYPERCHOLESTEROLEMIA: ICD-10-CM

## 2021-04-01 DIAGNOSIS — E55.9 VITAMIN D DEFICIENCY: ICD-10-CM

## 2021-04-02 LAB
25(OH)D3+25(OH)D2 SERPL-MCNC: 72.3 NG/ML (ref 30–100)
ALBUMIN SERPL-MCNC: 4.3 G/DL (ref 3.5–5.2)
ALBUMIN/GLOB SERPL: 2.3 G/DL
ALP SERPL-CCNC: 67 U/L (ref 39–117)
ALT SERPL-CCNC: 24 U/L (ref 1–33)
AST SERPL-CCNC: 27 U/L (ref 1–32)
BASOPHILS # BLD AUTO: 0.09 10*3/MM3 (ref 0–0.2)
BASOPHILS NFR BLD AUTO: 1.9 % (ref 0–1.5)
BILIRUB SERPL-MCNC: 0.7 MG/DL (ref 0–1.2)
BUN SERPL-MCNC: 19 MG/DL (ref 8–23)
BUN/CREAT SERPL: 19.8 (ref 7–25)
CALCIUM SERPL-MCNC: 9.6 MG/DL (ref 8.6–10.5)
CHLORIDE SERPL-SCNC: 103 MMOL/L (ref 98–107)
CHOLEST SERPL-MCNC: 187 MG/DL (ref 0–200)
CHOLEST/HDLC SERPL: 2.31 {RATIO}
CO2 SERPL-SCNC: 25.8 MMOL/L (ref 22–29)
CREAT SERPL-MCNC: 0.96 MG/DL (ref 0.57–1)
EOSINOPHIL # BLD AUTO: 0.09 10*3/MM3 (ref 0–0.4)
EOSINOPHIL NFR BLD AUTO: 1.9 % (ref 0.3–6.2)
ERYTHROCYTE [DISTWIDTH] IN BLOOD BY AUTOMATED COUNT: 13.1 % (ref 12.3–15.4)
GLOBULIN SER CALC-MCNC: 1.9 GM/DL
GLUCOSE SERPL-MCNC: 99 MG/DL (ref 65–99)
HCT VFR BLD AUTO: 45 % (ref 34–46.6)
HDLC SERPL-MCNC: 81 MG/DL (ref 40–60)
HGB BLD-MCNC: 15.1 G/DL (ref 12–15.9)
IMM GRANULOCYTES # BLD AUTO: 0.01 10*3/MM3 (ref 0–0.05)
IMM GRANULOCYTES NFR BLD AUTO: 0.2 % (ref 0–0.5)
LDLC SERPL CALC-MCNC: 90 MG/DL (ref 0–100)
LYMPHOCYTES # BLD AUTO: 1.83 10*3/MM3 (ref 0.7–3.1)
LYMPHOCYTES NFR BLD AUTO: 38.4 % (ref 19.6–45.3)
MCH RBC QN AUTO: 32.7 PG (ref 26.6–33)
MCHC RBC AUTO-ENTMCNC: 33.6 G/DL (ref 31.5–35.7)
MCV RBC AUTO: 97.4 FL (ref 79–97)
MONOCYTES # BLD AUTO: 0.41 10*3/MM3 (ref 0.1–0.9)
MONOCYTES NFR BLD AUTO: 8.6 % (ref 5–12)
NEUTROPHILS # BLD AUTO: 2.34 10*3/MM3 (ref 1.7–7)
NEUTROPHILS NFR BLD AUTO: 49 % (ref 42.7–76)
NRBC BLD AUTO-RTO: 0 /100 WBC (ref 0–0.2)
PLATELET # BLD AUTO: 218 10*3/MM3 (ref 140–450)
POTASSIUM SERPL-SCNC: 4.7 MMOL/L (ref 3.5–5.2)
PROT SERPL-MCNC: 6.2 G/DL (ref 6–8.5)
RBC # BLD AUTO: 4.62 10*6/MM3 (ref 3.77–5.28)
SODIUM SERPL-SCNC: 141 MMOL/L (ref 136–145)
TRIGL SERPL-MCNC: 91 MG/DL (ref 0–150)
TSH SERPL DL<=0.005 MIU/L-ACNC: 3.49 UIU/ML (ref 0.45–4.5)
VLDLC SERPL CALC-MCNC: 16 MG/DL (ref 5–40)
WBC # BLD AUTO: 4.77 10*3/MM3 (ref 3.4–10.8)

## 2021-04-20 ENCOUNTER — TELEPHONE (OUTPATIENT)
Dept: INTERNAL MEDICINE | Facility: CLINIC | Age: 81
End: 2021-04-20

## 2021-04-29 ENCOUNTER — OFFICE VISIT (OUTPATIENT)
Dept: INTERNAL MEDICINE | Facility: CLINIC | Age: 81
End: 2021-04-29

## 2021-04-29 VITALS
BODY MASS INDEX: 26.33 KG/M2 | TEMPERATURE: 97.7 F | OXYGEN SATURATION: 98 % | SYSTOLIC BLOOD PRESSURE: 132 MMHG | DIASTOLIC BLOOD PRESSURE: 70 MMHG | HEART RATE: 71 BPM | HEIGHT: 65 IN | WEIGHT: 158 LBS

## 2021-04-29 DIAGNOSIS — E55.9 VITAMIN D DEFICIENCY: ICD-10-CM

## 2021-04-29 DIAGNOSIS — J30.1 NON-SEASONAL ALLERGIC RHINITIS DUE TO POLLEN: ICD-10-CM

## 2021-04-29 DIAGNOSIS — Z12.11 SCREENING FOR COLON CANCER: ICD-10-CM

## 2021-04-29 DIAGNOSIS — Z00.00 MEDICARE ANNUAL WELLNESS VISIT, SUBSEQUENT: Primary | ICD-10-CM

## 2021-04-29 DIAGNOSIS — I10 ESSENTIAL HYPERTENSION: ICD-10-CM

## 2021-04-29 DIAGNOSIS — F41.1 GENERALIZED ANXIETY DISORDER: ICD-10-CM

## 2021-04-29 DIAGNOSIS — E78.00 HYPERCHOLESTEROLEMIA: ICD-10-CM

## 2021-04-29 PROCEDURE — G0439 PPPS, SUBSEQ VISIT: HCPCS | Performed by: FAMILY MEDICINE

## 2021-04-29 PROCEDURE — 99214 OFFICE O/P EST MOD 30 MIN: CPT | Performed by: FAMILY MEDICINE

## 2021-04-29 RX ORDER — FOLIC ACID/VIT B COMPLEX AND C 0.8 MG
TABLET ORAL
COMMUNITY

## 2021-04-29 RX ORDER — LISINOPRIL 10 MG/1
10 TABLET ORAL DAILY
Qty: 90 TABLET | Refills: 1 | Status: SHIPPED | OUTPATIENT
Start: 2021-04-29 | End: 2021-06-07

## 2021-04-29 RX ORDER — LEVOTHYROXINE SODIUM 0.05 MG/1
50 TABLET ORAL DAILY
Qty: 90 TABLET | Refills: 1 | Status: SHIPPED | OUTPATIENT
Start: 2021-04-29 | End: 2021-11-03 | Stop reason: SDUPTHER

## 2021-04-29 RX ORDER — FLUTICASONE PROPIONATE 50 MCG
2 SPRAY, SUSPENSION (ML) NASAL DAILY
Qty: 48 G | Refills: 5 | Status: SHIPPED | OUTPATIENT
Start: 2021-04-29

## 2021-04-29 RX ORDER — MONTELUKAST SODIUM 10 MG/1
10 TABLET ORAL NIGHTLY
Qty: 90 TABLET | Refills: 1 | Status: SHIPPED | OUTPATIENT
Start: 2021-04-29 | End: 2021-06-07

## 2021-04-29 RX ORDER — DULOXETIN HYDROCHLORIDE 60 MG/1
60 CAPSULE, DELAYED RELEASE ORAL DAILY
Qty: 90 CAPSULE | Refills: 1 | Status: SHIPPED | OUTPATIENT
Start: 2021-04-29 | End: 2021-06-07

## 2021-04-29 RX ORDER — ATORVASTATIN CALCIUM 80 MG/1
80 TABLET, FILM COATED ORAL NIGHTLY
Qty: 90 TABLET | Refills: 1 | Status: SHIPPED | OUTPATIENT
Start: 2021-04-29 | End: 2021-06-07

## 2021-04-29 RX ORDER — AZELASTINE HCL 205.5 UG/1
1 SPRAY NASAL DAILY PRN
Qty: 30 ML | Refills: 5 | Status: SHIPPED | OUTPATIENT
Start: 2021-04-29 | End: 2022-07-20

## 2021-04-29 RX ORDER — B-COMPLEX WITH VITAMIN C
TABLET ORAL
COMMUNITY

## 2021-04-29 NOTE — PROGRESS NOTES
Subjective   Joanna Ferris is a 80 y.o. female.   Chief Complaint   Patient presents with   • Medicare Wellness-subsequent   • Hypertension   • Hyperlipidemia   • Anxiety   • Allergic Rhinitis   • Vitamin D Deficiency       History of Present Illness     #1 hypertension- patient is on lisinopril 10 mg a day. She takes it everyday.  She has no side effects.  She has no chest pain, no shortness of breath, no lightheadedness.  She exercises daily.  She walks for 30 minutes a day. Kidney tests and electrolytes are stable.  Creatinine is at 0.96, GFR at 56.  She uses no NSAIDs.     #2 hypercholesterolemia- patient is Lipitor 80 mg a day. She takes it everyday. No muscle aches, she has occasional muscle cramps.  LDL 90, HDL 81, LFTs normal.      #3 arthritis/anxiety-it is localized in hands and knees. On Cymbalta 60 mg a day- It helps with anxiety and arthritis.  It controls her symptoms.  It was started in 2015.  She tried prior to that other medications but none of them worked as well as Cymbalta. Anxiety is controlled.  She does not have excessive worries.  Her mood is normal.  In the past she was treated by rheumatologist.       #4 allergic rhinitis- patient is on Flonase , Astepro and singular.   Symptoms are controlled.  She has bad taste with Astepro, but it does not stop her from using it.  She is no other side effects.    #5  Vitamin D deficiency- on 1000 units a day.  Vitamin D is normal at 72.3.    The following portions of the patient's history were reviewed and updated as appropriate: allergies, current medications, past family history, past medical history, past social history, past surgical history and problem list.    Review of Systems   Constitutional: Negative.    HENT: Negative.    Respiratory: Negative for shortness of breath.    Cardiovascular: Negative for chest pain.   Musculoskeletal: Positive for arthralgias.   Neurological: Negative for light-headedness.   Psychiatric/Behavioral: Negative.           Objective   Wt Readings from Last 3 Encounters:   04/29/21 71.7 kg (158 lb)   09/08/20 70.8 kg (156 lb)   08/26/19 69.4 kg (153 lb)      Vitals:    04/29/21 1058   BP: 132/70   Pulse: 71   Temp: 97.7 °F (36.5 °C)   SpO2: 98%     Temp Readings from Last 3 Encounters:   04/29/21 97.7 °F (36.5 °C)   09/08/20 98.2 °F (36.8 °C)   08/26/19 98.4 °F (36.9 °C)     BP Readings from Last 3 Encounters:   04/29/21 132/70   09/08/20 110/64   08/26/19 130/60     Pulse Readings from Last 3 Encounters:   04/29/21 71   09/08/20 75   08/26/19 97     Body mass index is 26.29 kg/m².    Physical Exam  Constitutional:       Appearance: Normal appearance. She is well-developed.   HENT:      Head: Normocephalic and atraumatic.   Neck:      Thyroid: No thyromegaly.      Vascular: No carotid bruit.   Cardiovascular:      Rate and Rhythm: Normal rate and regular rhythm.      Heart sounds: Normal heart sounds.   Pulmonary:      Effort: Pulmonary effort is normal.      Breath sounds: Normal breath sounds.   Musculoskeletal:      Cervical back: Neck supple.   Skin:     General: Skin is warm and dry.   Neurological:      Mental Status: She is alert and oriented to person, place, and time.   Psychiatric:         Behavior: Behavior normal.         Assessment/Plan   Diagnoses and all orders for this visit:    1. Medicare annual wellness visit, subsequent (Primary)    2. Essential hypertension    3. Hypercholesterolemia    4. Generalized anxiety disorder    5. Vitamin D deficiency    6. Non-seasonal allergic rhinitis due to pollen    7. Screening for colon cancer  -     Cologuard - Stool, Per Rectum; Future    Other orders  -     fluticasone (FLONASE) 50 MCG/ACT nasal spray; 2 sprays by Each Nare route Daily.  Dispense: 48 g; Refill: 5  -     azelastine (ASTEPRO) 0.15 % solution nasal spray; 1 spray into the nostril(s) as directed by provider Daily As Needed for Rhinitis.  Dispense: 30 mL; Refill: 5  -     lisinopril (PRINIVIL,ZESTRIL) 10 MG  tablet; Take 1 tablet by mouth Daily.  Dispense: 90 tablet; Refill: 1  -     montelukast (SINGULAIR) 10 MG tablet; Take 1 tablet by mouth Every Night.  Dispense: 90 tablet; Refill: 1  -     levothyroxine (SYNTHROID, LEVOTHROID) 50 MCG tablet; Take 1 tablet by mouth Daily.  Dispense: 90 tablet; Refill: 1  -     DULoxetine (CYMBALTA) 60 MG capsule; Take 1 capsule by mouth Daily.  Dispense: 90 capsule; Refill: 1  -     atorvastatin (LIPITOR) 80 MG tablet; Take 1 tablet by mouth Every Night.  Dispense: 90 tablet; Refill: 1        #1 hypertension-continue current treatment.  Follow-up in 6 months.  2.  Hyperlipidemia-continue same.  Follow-up in 6 months.  3.  Anxiety-continue same.  Follow-up in 6 months.  4.  Allergic rhinitis-continue same.  Follow-up in 12 months.  Black box warnings on singular on psychiatric side effects discussed.  5.  Vitamin D deficiency-continue same.  Follow-up in 12 months.

## 2021-04-29 NOTE — PROGRESS NOTES
The ABCs of the Annual Wellness Visit  Subsequent Medicare Wellness Visit    Chief Complaint   Patient presents with   • Medicare Wellness-subsequent   • Hypertension   • Hyperlipidemia   • Anxiety   • Allergic Rhinitis   • Vitamin D Deficiency       Subjective   History of Present Illness:  Joanna Ferris is a 80 y.o. female who presents for a Subsequent Medicare Wellness Visit.    HEALTH RISK ASSESSMENT    Recent Hospitalizations:  No hospitalization(s) within the last year.    Current Medical Providers:  Patient Care Team:  Ekaterina Santamaria MD as PCP - General    Smoking Status:  Social History     Tobacco Use   Smoking Status Former Smoker   Smokeless Tobacco Never Used       Alcohol Consumption:  Social History     Substance and Sexual Activity   Alcohol Use Yes   • Alcohol/week: 7.0 standard drinks   • Types: 7 Shots of liquor per week       Depression Screen:   PHQ-2/PHQ-9 Depression Screening 4/29/2021   Little interest or pleasure in doing things 0   Feeling down, depressed, or hopeless 0   Trouble falling or staying asleep, or sleeping too much 0   Feeling tired or having little energy 0   Poor appetite or overeating 0   Feeling bad about yourself - or that you are a failure or have let yourself or your family down 0   Trouble concentrating on things, such as reading the newspaper or watching television 0   Moving or speaking so slowly that other people could have noticed. Or the opposite - being so fidgety or restless that you have been moving around a lot more than usual 0   Thoughts that you would be better off dead, or of hurting yourself in some way 0   Total Score 0   If you checked off any problems, how difficult have these problems made it for you to do your work, take care of things at home, or get along with other people? Not difficult at all       Fall Risk Screen:  STEADI Fall Risk Assessment was completed, and patient is at LOW risk for falls.Assessment completed on:4/29/2021    Health Habits  and Functional and Cognitive Screening:  Functional & Cognitive Status 4/29/2021   Do you have difficulty preparing food and eating? No   Do you have difficulty bathing yourself, getting dressed or grooming yourself? No   Do you have difficulty using the toilet? No   Do you have difficulty moving around from place to place? No   Do you have trouble with steps or getting out of a bed or a chair? No   Current Diet Well Balanced Diet   Dental Exam Up to date   Eye Exam Not up to date   Exercise (times per week) 7 times per week   Current Exercises Include Walking   Current Exercise Activities Include -   Do you need help using the phone?  No   Are you deaf or do you have serious difficulty hearing?  No   Do you need help with transportation? No   Do you need help shopping? No   Do you need help preparing meals?  No   Do you need help with housework?  No   Do you need help with laundry? No   Do you need help taking your medications? No   Do you need help managing money? No   Do you ever drive or ride in a car without wearing a seat belt? No   Have you felt unusual stress, anger or loneliness in the last month? No   Who do you live with? Spouse   If you need help, do you have trouble finding someone available to you? No   Have you been bothered in the last four weeks by sexual problems? No   Do you have difficulty concentrating, remembering or making decisions? No         Does the patient have evidence of cognitive impairment? No    Asprin use counseling:Does not need ASA (and currently is not on it)    Age-appropriate Screening Schedule:  Refer to the list below for future screening recommendations based on patient's age, sex and/or medical conditions. Orders for these recommended tests are listed in the plan section. The patient has been provided with a written plan.    Health Maintenance   Topic Date Due   • MAMMOGRAM  05/27/2021 (Originally 11/4/2020)   • INFLUENZA VACCINE  08/01/2021   • DXA SCAN  10/10/2021   •  LIPID PANEL  04/01/2022   • TDAP/TD VACCINES (3 - Td) 11/01/2029   • ZOSTER VACCINE  Addressed          The following portions of the patient's history were reviewed and updated as appropriate: allergies, current medications, past family history, past medical history, past social history, past surgical history and problem list.    Outpatient Medications Prior to Visit   Medication Sig Dispense Refill   • acetaminophen (TYLENOL) 325 MG tablet Take 2 tablets by mouth Every 6 (Six) Hours As Needed for Mild Pain .     • Calcium Citrate-Vitamin D (CALCIUM + D PO) Take  by mouth daily.     • Cholecalciferol (VITAMIN D3) 2000 UNITS tablet Take 5,000 Units by mouth Daily.     • Coenzyme Q10 (CO Q10) 100 MG capsule Take 1 capsule by mouth Daily.     • Folic Acid-B2-B6-B12-C-Choline (Folic Acid XTRA) tablet Take  by mouth.     • Lactobacillus (PROBIOTIC ACIDOPHILUS PO) Take 4 capsules by mouth Daily.     • Naphazoline-Pheniramine (OPCON-A OP) Apply 2 drops to eye(s) as directed by provider Daily.     • Zinc 100 MG tablet Take  by mouth.     • atorvastatin (LIPITOR) 80 MG tablet TAKE 1 TABLET BY MOUTH EVERY NIGHT 90 tablet 0   • azelastine (ASTEPRO) 0.15 % solution nasal spray 1 spray into the nostril(s) as directed by provider Daily As Needed for Rhinitis. 30 mL 5   • DULoxetine (CYMBALTA) 60 MG capsule TAKE 1 CAPSULE BY MOUTH DAILY 90 capsule 0   • fluticasone (FLONASE) 50 MCG/ACT nasal spray INSTILL 2 SPRAYS IN EACH NOSTRIL DAILY 48 g 2   • levothyroxine (SYNTHROID, LEVOTHROID) 50 MCG tablet TAKE 1 TABLET BY MOUTH DAILY 90 tablet 1   • lisinopril (PRINIVIL,ZESTRIL) 10 MG tablet TAKE 1 TABLET BY MOUTH DAILY 90 tablet 0   • montelukast (SINGULAIR) 10 MG tablet TAKE 1 TABLET BY MOUTH EVERY NIGHT 90 tablet 0     No facility-administered medications prior to visit.       Patient Active Problem List   Diagnosis   • CKD (chronic kidney disease) stage 3, GFR 30-59 ml/min (CMS/[facility])   • Allergic conjunctivitis   • Atopic rhinitis   •  "Arthritis   • Depression   • Generalized anxiety disorder   • Hypercholesterolemia   • Hypertension   • Hypothyroidism   • Osteopenia   • Vitamin D deficiency   • Hypertensive encephalopathy       Advanced Care Planning:  ACP discussion was held with the patient during this visit. Patient has an advance directive in EMR which is still valid.     Review of Systems    Compared to one year ago, the patient feels her physical health is the same.  Compared to one year ago, the patient feels her mental health is the same.    Reviewed chart for potential of high risk medication in the elderly: yes  Reviewed chart for potential of harmful drug interactions in the elderly:yes    Objective         Vitals:    04/29/21 1058   BP: 132/70   Pulse: 71   Temp: 97.7 °F (36.5 °C)   SpO2: 98%   Weight: 71.7 kg (158 lb)   Height: 165.1 cm (65\")   PainSc: 0-No pain       Body mass index is 26.29 kg/m².  Discussed the patient's BMI with her. The BMI is above average; BMI management plan is completed.    Physical Exam    Lab Results   Component Value Date    GLU 99 04/01/2021    CHLPL 187 04/01/2021    TRIG 91 04/01/2021    HDL 81 (H) 04/01/2021    LDL 90 04/01/2021    VLDL 16 04/01/2021        Assessment/Plan   Medicare Risks and Personalized Health Plan  CMS Preventative Services Quick Reference  Advance Directive Discussion  Cardiovascular risk  Obesity/Overweight      Advance directives are on file and patient confirmed that they are up-to-date.  Her  Rafa and daughter Mack are healthcare surrogate.  She is up-to-date with vaccinations.  She is planning to increase amount of exercise.  She already walks 30 minutes a day, 7 days a week.  She is going to exercise on rowing machine.  She enjoys it.  She will work on decreasing portion size to lose weight.  She is due for eye exam and is going to schedule it.  She is due for colon cancer screening.  She prefers Cologuard.  We are ordering it.  Continue regular dental " appointments.    The above risks/problems have been discussed with the patient.  Pertinent information has been shared with the patient in the After Visit Summary.  Follow up plans and orders are seen below in the Assessment/Plan Section.    Diagnoses and all orders for this visit:    1. Medicare annual wellness visit, subsequent (Primary)    2. Essential hypertension    3. Hypercholesterolemia    4. Generalized anxiety disorder    5. Vitamin D deficiency    6. Non-seasonal allergic rhinitis due to pollen    7. Screening for colon cancer  -     Cologuard - Stool, Per Rectum; Future    Other orders  -     fluticasone (FLONASE) 50 MCG/ACT nasal spray; 2 sprays by Each Nare route Daily.  Dispense: 48 g; Refill: 5  -     azelastine (ASTEPRO) 0.15 % solution nasal spray; 1 spray into the nostril(s) as directed by provider Daily As Needed for Rhinitis.  Dispense: 30 mL; Refill: 5  -     lisinopril (PRINIVIL,ZESTRIL) 10 MG tablet; Take 1 tablet by mouth Daily.  Dispense: 90 tablet; Refill: 1  -     montelukast (SINGULAIR) 10 MG tablet; Take 1 tablet by mouth Every Night.  Dispense: 90 tablet; Refill: 1  -     levothyroxine (SYNTHROID, LEVOTHROID) 50 MCG tablet; Take 1 tablet by mouth Daily.  Dispense: 90 tablet; Refill: 1  -     DULoxetine (CYMBALTA) 60 MG capsule; Take 1 capsule by mouth Daily.  Dispense: 90 capsule; Refill: 1  -     atorvastatin (LIPITOR) 80 MG tablet; Take 1 tablet by mouth Every Night.  Dispense: 90 tablet; Refill: 1      Follow Up:  Return in about 6 months (around 10/29/2021).     An After Visit Summary and PPPS were given to the patient.

## 2021-04-29 NOTE — PATIENT INSTRUCTIONS
Medicare Wellness  Personal Prevention Plan of Service     Date of Office Visit:  2021  Encounter Provider:  Ekaterina Santamaria MD  Place of Service:  [facility]  Patient Name: Joanna Ferris  :  1940    As part of the Medicare Wellness portion of your visit today, we are providing you with this personalized preventive plan of services (PPPS). This plan is based upon recommendations of the [place] Preventive Services Task Force (USPSTF) and the Advisory Committee on Immunization Practices (ACIP).    This lists the preventive care services that should be considered, and provides dates of when you are due. Items listed as completed are up-to-date and do not require any further intervention.    Health Maintenance   Topic Date Due   • COLOGUARD  Never done   • ANNUAL WELLNESS VISIT  2020   • MAMMOGRAM  2021 (Originally 2020)   • INFLUENZA VACCINE  2021   • DXA SCAN  10/10/2021   • LIPID PANEL  2022   • TDAP/TD VACCINES (3 - Td) 2029   • HEPATITIS C SCREENING  Completed   • COVID-19 Vaccine  Completed   • Pneumococcal Vaccine 65+  Completed   • ZOSTER VACCINE  Addressed       Orders Placed This Encounter   Procedures   • Cologuard - Stool, Per Rectum     Standing Status:   Future     Standing Expiration Date:   2022     Order Specific Question:   Release to patient     Answer:   Immediate       Return in about 6 months (around 10/29/2021).

## 2021-05-26 ENCOUNTER — APPOINTMENT (OUTPATIENT)
Dept: WOMENS IMAGING | Facility: HOSPITAL | Age: 81
End: 2021-05-26

## 2021-05-26 PROCEDURE — 77063 BREAST TOMOSYNTHESIS BI: CPT | Performed by: RADIOLOGY

## 2021-05-26 PROCEDURE — 77067 SCR MAMMO BI INCL CAD: CPT | Performed by: RADIOLOGY

## 2021-06-07 RX ORDER — DULOXETIN HYDROCHLORIDE 60 MG/1
60 CAPSULE, DELAYED RELEASE ORAL DAILY
Qty: 90 CAPSULE | Refills: 1 | Status: SHIPPED | OUTPATIENT
Start: 2021-06-07 | End: 2021-11-03 | Stop reason: SDUPTHER

## 2021-06-07 RX ORDER — ATORVASTATIN CALCIUM 80 MG/1
80 TABLET, FILM COATED ORAL NIGHTLY
Qty: 90 TABLET | Refills: 1 | Status: SHIPPED | OUTPATIENT
Start: 2021-06-07 | End: 2021-11-03 | Stop reason: SDUPTHER

## 2021-06-07 RX ORDER — LISINOPRIL 10 MG/1
10 TABLET ORAL DAILY
Qty: 90 TABLET | Refills: 1 | Status: SHIPPED | OUTPATIENT
Start: 2021-06-07 | End: 2021-11-03 | Stop reason: SDUPTHER

## 2021-06-07 RX ORDER — MONTELUKAST SODIUM 10 MG/1
10 TABLET ORAL NIGHTLY
Qty: 90 TABLET | Refills: 1 | Status: SHIPPED | OUTPATIENT
Start: 2021-06-07 | End: 2021-11-03 | Stop reason: SDUPTHER

## 2021-08-31 ENCOUNTER — TELEPHONE (OUTPATIENT)
Dept: INTERNAL MEDICINE | Facility: CLINIC | Age: 81
End: 2021-08-31

## 2021-08-31 DIAGNOSIS — Z12.11 SCREENING FOR COLON CANCER: Primary | ICD-10-CM

## 2021-08-31 NOTE — TELEPHONE ENCOUNTER
Caller: Joanna Ferris    Relationship: Self    Best call back number: [phone]    What orders are you requesting (i.e. lab or imaging): COLOR GUARD TEST     In what timeframe would the patient need to come in: ASAP     Where will you receive your lab/imaging services: IN OFFICE     Additional notes: PATIENT IS WANTING A COLORGUARD TEST DUE TO HAVING TWO ENDOSCOPIES AND NOT WANTING ANOTHER ONE AND HAVING A COLORGUARD INSTEAD

## 2021-10-26 DIAGNOSIS — E78.00 HYPERCHOLESTEROLEMIA: Primary | ICD-10-CM

## 2021-10-26 DIAGNOSIS — I10 PRIMARY HYPERTENSION: ICD-10-CM

## 2021-10-27 ENCOUNTER — FLU SHOT (OUTPATIENT)
Dept: INTERNAL MEDICINE | Facility: CLINIC | Age: 81
End: 2021-10-27

## 2021-10-27 DIAGNOSIS — Z23 NEED FOR INFLUENZA VACCINATION: Primary | ICD-10-CM

## 2021-10-27 PROCEDURE — 90662 IIV NO PRSV INCREASED AG IM: CPT | Performed by: FAMILY MEDICINE

## 2021-10-27 PROCEDURE — G0008 ADMIN INFLUENZA VIRUS VAC: HCPCS | Performed by: FAMILY MEDICINE

## 2021-10-28 LAB
ALBUMIN SERPL-MCNC: 4.5 G/DL (ref 3.7–4.7)
ALBUMIN/GLOB SERPL: 2.6 {RATIO} (ref 1.2–2.2)
ALP SERPL-CCNC: 67 IU/L (ref 44–121)
ALT SERPL-CCNC: 20 IU/L (ref 0–32)
AST SERPL-CCNC: 27 IU/L (ref 0–40)
BILIRUB SERPL-MCNC: 0.8 MG/DL (ref 0–1.2)
BUN SERPL-MCNC: 13 MG/DL (ref 8–27)
BUN/CREAT SERPL: 13 (ref 12–28)
CALCIUM SERPL-MCNC: 10 MG/DL (ref 8.7–10.3)
CHLORIDE SERPL-SCNC: 102 MMOL/L (ref 96–106)
CHOLEST SERPL-MCNC: 190 MG/DL (ref 100–199)
CO2 SERPL-SCNC: 27 MMOL/L (ref 20–29)
CREAT SERPL-MCNC: 1 MG/DL (ref 0.57–1)
ERYTHROCYTE [DISTWIDTH] IN BLOOD BY AUTOMATED COUNT: 13.2 % (ref 11.7–15.4)
GLOBULIN SER CALC-MCNC: 1.7 G/DL (ref 1.5–4.5)
GLUCOSE SERPL-MCNC: 86 MG/DL (ref 65–99)
HCT VFR BLD AUTO: 44.4 % (ref 34–46.6)
HDLC SERPL-MCNC: 83 MG/DL
HGB BLD-MCNC: 14.5 G/DL (ref 11.1–15.9)
LDLC SERPL CALC-MCNC: 92 MG/DL (ref 0–99)
LDLC/HDLC SERPL: 1.1 RATIO (ref 0–3.2)
MCH RBC QN AUTO: 31.5 PG (ref 26.6–33)
MCHC RBC AUTO-ENTMCNC: 32.7 G/DL (ref 31.5–35.7)
MCV RBC AUTO: 97 FL (ref 79–97)
PLATELET # BLD AUTO: 224 X10E3/UL (ref 150–450)
POTASSIUM SERPL-SCNC: 4.5 MMOL/L (ref 3.5–5.2)
PROT SERPL-MCNC: 6.2 G/DL (ref 6–8.5)
RBC # BLD AUTO: 4.6 X10E6/UL (ref 3.77–5.28)
SODIUM SERPL-SCNC: 143 MMOL/L (ref 134–144)
TRIGL SERPL-MCNC: 81 MG/DL (ref 0–149)
VLDLC SERPL CALC-MCNC: 15 MG/DL (ref 5–40)
WBC # BLD AUTO: 5.2 X10E3/UL (ref 3.4–10.8)

## 2021-11-03 ENCOUNTER — OFFICE VISIT (OUTPATIENT)
Dept: INTERNAL MEDICINE | Facility: CLINIC | Age: 81
End: 2021-11-03

## 2021-11-03 VITALS
HEART RATE: 87 BPM | SYSTOLIC BLOOD PRESSURE: 140 MMHG | OXYGEN SATURATION: 97 % | TEMPERATURE: 98.2 F | HEIGHT: 65 IN | DIASTOLIC BLOOD PRESSURE: 70 MMHG | BODY MASS INDEX: 25.16 KG/M2 | WEIGHT: 151 LBS

## 2021-11-03 DIAGNOSIS — E03.9 ACQUIRED HYPOTHYROIDISM: ICD-10-CM

## 2021-11-03 DIAGNOSIS — I10 PRIMARY HYPERTENSION: Primary | ICD-10-CM

## 2021-11-03 DIAGNOSIS — E78.00 HYPERCHOLESTEROLEMIA: ICD-10-CM

## 2021-11-03 DIAGNOSIS — F41.1 GENERALIZED ANXIETY DISORDER: ICD-10-CM

## 2021-11-03 PROCEDURE — 99214 OFFICE O/P EST MOD 30 MIN: CPT | Performed by: FAMILY MEDICINE

## 2021-11-03 RX ORDER — MONTELUKAST SODIUM 10 MG/1
10 TABLET ORAL NIGHTLY
Qty: 90 TABLET | Refills: 1 | Status: SHIPPED | OUTPATIENT
Start: 2021-11-03 | End: 2022-05-11

## 2021-11-03 RX ORDER — ATORVASTATIN CALCIUM 80 MG/1
80 TABLET, FILM COATED ORAL NIGHTLY
Qty: 90 TABLET | Refills: 1 | Status: SHIPPED | OUTPATIENT
Start: 2021-11-03 | End: 2022-05-20 | Stop reason: SDUPTHER

## 2021-11-03 RX ORDER — DULOXETIN HYDROCHLORIDE 60 MG/1
60 CAPSULE, DELAYED RELEASE ORAL DAILY
Qty: 90 CAPSULE | Refills: 1 | Status: SHIPPED | OUTPATIENT
Start: 2021-11-03 | End: 2022-05-11

## 2021-11-03 RX ORDER — LISINOPRIL 10 MG/1
10 TABLET ORAL DAILY
Qty: 90 TABLET | Refills: 1 | Status: SHIPPED | OUTPATIENT
Start: 2021-11-03 | End: 2022-05-11

## 2021-11-03 RX ORDER — LEVOTHYROXINE SODIUM 0.05 MG/1
50 TABLET ORAL DAILY
Qty: 90 TABLET | Refills: 1 | Status: SHIPPED | OUTPATIENT
Start: 2021-11-03 | End: 2022-05-10

## 2021-11-03 NOTE — PATIENT INSTRUCTIONS
"https://www.nhlbi.nih.gov/files/docs/public/heart/dash_brief.pdf\">   DASH Eating Plan  DASH stands for Dietary Approaches to Stop Hypertension. The DASH eating plan is a healthy eating plan that has been shown to:  · Reduce high blood pressure (hypertension).  · Reduce your risk for type 2 diabetes, heart disease, and stroke.  · Help with weight loss.  What are tips for following this plan?  Reading food labels  · Check food labels for the amount of salt (sodium) per serving. Choose foods with less than 5 percent of the Daily Value of sodium. Generally, foods with less than 300 milligrams (mg) of sodium per serving fit into this eating plan.  · To find whole grains, look for the word \"whole\" as the first word in the ingredient list.  Shopping  · Buy products labeled as \"low-sodium\" or \"no salt added.\"  · Buy fresh foods. Avoid canned foods and pre-made or frozen meals.  Cooking  · Avoid adding salt when cooking. Use salt-free seasonings or herbs instead of table salt or sea salt. Check with your health care provider or pharmacist before using salt substitutes.  · Do not gonzalez foods. Cook foods using healthy methods such as baking, boiling, grilling, roasting, and broiling instead.  · Cook with heart-healthy oils, such as olive, canola, avocado, soybean, or sunflower oil.  Meal planning    · Eat a balanced diet that includes:  ? 4 or more servings of fruits and 4 or more servings of vegetables each day. Try to fill one-half of your plate with fruits and vegetables.  ? 6-8 servings of whole grains each day.  ? Less than 6 oz (170 g) of lean meat, poultry, or fish each day. A 3-oz (85-g) serving of meat is about the same size as a deck of cards. One egg equals 1 oz (28 g).  ? 2-3 servings of low-fat dairy each day. One serving is 1 cup (237 mL).  ? 1 serving of nuts, seeds, or beans 5 times each week.  ? 2-3 servings of heart-healthy fats. Healthy fats called omega-3 fatty acids are found in foods such as walnuts, " flaxseeds, fortified milks, and eggs. These fats are also found in cold-water fish, such as sardines, salmon, and mackerel.  · Limit how much you eat of:  ? Canned or prepackaged foods.  ? Food that is high in trans fat, such as some fried foods.  ? Food that is high in saturated fat, such as fatty meat.  ? Desserts and other sweets, sugary drinks, and other foods with added sugar.  ? Full-fat dairy products.  · Do not salt foods before eating.  · Do not eat more than 4 egg yolks a week.  · Try to eat at least 2 vegetarian meals a week.  · Eat more home-cooked food and less restaurant, buffet, and fast food.    Lifestyle  · When eating at a restaurant, ask that your food be prepared with less salt or no salt, if possible.  · If you drink alcohol:  ? Limit how much you use to:  § 0-1 drink a day for women who are not pregnant.  § 0-2 drinks a day for men.  ? Be aware of how much alcohol is in your drink. In the [place] one drink equals one 12 oz bottle of beer (355 mL), one 5 oz glass of wine (148 mL), or one 1½ oz glass of hard liquor (44 mL).  General information  · Avoid eating more than 2,300 mg of salt a day. If you have hypertension, you may need to reduce your sodium intake to 1,500 mg a day.  · Work with your health care provider to maintain a healthy body weight or to lose weight. Ask what an ideal weight is for you.  · Get at least 30 minutes of exercise that causes your heart to beat faster (aerobic exercise) most days of the week. Activities may include walking, swimming, or biking.  · Work with your health care provider or dietitian to adjust your eating plan to your individual calorie needs.  What foods should I eat?  Fruits  All fresh, dried, or frozen fruit. Canned fruit in natural juice (without added sugar).  Vegetables  Fresh or frozen vegetables (raw, steamed, roasted, or grilled). Low-sodium or reduced-sodium tomato and vegetable juice. Low-sodium or reduced-sodium tomato sauce and tomato paste.  Low-sodium or reduced-sodium canned vegetables.  Grains  Whole-grain or whole-wheat bread. Whole-grain or whole-wheat pasta. Brown rice. Oatmeal. Quinoa. Bulgur. Whole-grain and low-sodium cereals. Inga bread. Low-fat, low-sodium crackers. Whole-wheat flour tortillas.  Meats and other proteins  Skinless chicken or turkey. Ground chicken or turkey. Pork with fat trimmed off. Fish and seafood. Egg whites. Dried beans, peas, or lentils. Unsalted nuts, nut butters, and seeds. Unsalted canned beans. Lean cuts of beef with fat trimmed off. Low-sodium, lean precooked or cured meat, such as sausages or meat loaves.  Dairy  Low-fat (1%) or fat-free (skim) milk. Reduced-fat, low-fat, or fat-free cheeses. Nonfat, low-sodium ricotta or cottage cheese. Low-fat or nonfat yogurt. Low-fat, low-sodium cheese.  Fats and oils  Soft margarine without trans fats. Vegetable oil. Reduced-fat, low-fat, or light mayonnaise and salad dressings (reduced-sodium). Canola, safflower, olive, avocado, soybean, and sunflower oils. Avocado.  Seasonings and condiments  Herbs. Spices. Seasoning mixes without salt.  Other foods  Unsalted popcorn and pretzels. Fat-free sweets.  The items listed above may not be a complete list of foods and beverages you can eat. Contact a dietitian for more information.  What foods should I avoid?  Fruits  Canned fruit in a light or heavy syrup. Fried fruit. Fruit in cream or butter sauce.  Vegetables  Creamed or fried vegetables. Vegetables in a cheese sauce. Regular canned vegetables (not low-sodium or reduced-sodium). Regular canned tomato sauce and paste (not low-sodium or reduced-sodium). Regular tomato and vegetable juice (not low-sodium or reduced-sodium). Pickles. Olives.  Grains  Baked goods made with fat, such as croissants, muffins, or some breads. Dry pasta or rice meal packs.  Meats and other proteins  Fatty cuts of meat. Ribs. Fried meat. Colmenares. Bologna, salami, and other precooked or cured meats, such as  sausages or meat loaves. Fat from the back of a pig (fatback). Bratwurst. Salted nuts and seeds. Canned beans with added salt. Canned or smoked fish. Whole eggs or egg yolks. Chicken or turkey with skin.  Dairy  Whole or 2% milk, cream, and half-and-half. Whole or full-fat cream cheese. Whole-fat or sweetened yogurt. Full-fat cheese. Nondairy creamers. Whipped toppings. Processed cheese and cheese spreads.  Fats and oils  Butter. Stick margarine. Lard. Shortening. Ghee. Colmenares fat. Tropical oils, such as coconut, palm kernel, or palm oil.  Seasonings and condiments  Onion salt, garlic salt, seasoned salt, table salt, and sea salt. Worcestershire sauce. Tartar sauce. Barbecue sauce. Teriyaki sauce. Soy sauce, including reduced-sodium. Steak sauce. Canned and packaged gravies. Fish sauce. Oyster sauce. Cocktail sauce. Store-bought horseradish. Ketchup. Mustard. Meat flavorings and tenderizers. Bouillon cubes. Hot sauces. Pre-made or packaged marinades. Pre-made or packaged taco seasonings. Relishes. Regular salad dressings.  Other foods  Salted popcorn and pretzels.  The items listed above may not be a complete list of foods and beverages you should avoid. Contact a dietitian for more information.  Where to find more information  · National Heart, Lung, and Blood [place]: www.nhlbi.nih.gov  · American Heart Association: www.heart.org  · Academy of Nutrition and Dietetics: www.eatright.org  · National Kidney Foundation: www.kidney.org  Summary  · The DASH eating plan is a healthy eating plan that has been shown to reduce high blood pressure (hypertension). It may also reduce your risk for type 2 diabetes, heart disease, and stroke.  · When on the DASH eating plan, aim to eat more fresh fruits and vegetables, whole grains, lean proteins, low-fat dairy, and heart-healthy fats.  · With the DASH eating plan, you should limit salt (sodium) intake to 2,300 mg a day. If you have hypertension, you may need to reduce your  sodium intake to 1,500 mg a day.  · Work with your health care provider or dietitian to adjust your eating plan to your individual calorie needs.  This information is not intended to replace advice given to you by your health care provider. Make sure you discuss any questions you have with your health care provider.  Document Revised: 11/20/2020 Document Reviewed: 11/20/2020  [employer] Patient Education © 2021 [employer]

## 2021-11-03 NOTE — PROGRESS NOTES
"Subjective   Joanna Ferris is a 80 y.o. female.   Chief Complaint   Patient presents with   • Hypertension   • Hyperlipidemia   • Hypothyroidism   • Anxiety       History of Present Illness     #1 hypertension- patient is on lisinopril 10 mg a day. She takes it everyday.  She has no side effects.  She has no chest pain, no shortness of breath, no lightheadedness.  She exercises daily.  She rows every day. She walks for 45 minutes a day. Kidney tests and electrolytes are stable.  Creatinine is at 1.0, GFR 54.  She uses no NSAIDs.     #2 hypercholesterolemia- patient is Lipitor 80 mg a day. She takes it everyday. No muscle aches, she has occasional muscle cramps.  LDL 92, HDL 83, LFTs normal.      #3 arthritis/anxiety-it is localized in hands and knees. On Cymbalta 60 mg a day- It helps with anxiety and arthritis.  It controls her symptoms.  It was started in 2015.  She tried prior to that other medications but none of them worked as well as Cymbalta. Anxiety is controlled.  She does not have excessive worries.  Her mood is normal.  She feels \"fantastic\".  In the past she was treated by rheumatologist.      #4  Hypothyroidism- patient is on levothyroxine at 50 mcg a day.  She takes it every day.  She takes on empty stomach and does not eat for at least an hour after taking it.  TSH at 3.4.    The following portions of the patient's history were reviewed and updated as appropriate: allergies, current medications, past family history, past medical history, past social history, past surgical history and problem list.    Review of Systems   Respiratory: Negative for shortness of breath.    Cardiovascular: Negative for chest pain and palpitations.   Musculoskeletal: Positive for arthralgias. Negative for myalgias.   Neurological: Negative for light-headedness.   Psychiatric/Behavioral: Negative.          Objective   Wt Readings from Last 3 Encounters:   11/03/21 68.5 kg (151 lb)   04/29/21 71.7 kg (158 lb)   09/08/20 70.8 " kg (156 lb)      Vitals:    11/03/21 1058   BP: 140/70   Pulse: 87   Temp: 98.2 °F (36.8 °C)   SpO2: 97%     Temp Readings from Last 3 Encounters:   11/03/21 98.2 °F (36.8 °C)   04/29/21 97.7 °F (36.5 °C)   09/08/20 98.2 °F (36.8 °C)     BP Readings from Last 3 Encounters:   11/03/21 140/70   04/29/21 132/70   09/08/20 110/64     Pulse Readings from Last 3 Encounters:   11/03/21 87   04/29/21 71   09/08/20 75     Body mass index is 25.13 kg/m².    Physical Exam  Constitutional:       Appearance: Normal appearance. She is well-developed.   HENT:      Head: Normocephalic and atraumatic.   Neck:      Thyroid: No thyromegaly.      Vascular: No carotid bruit.   Cardiovascular:      Rate and Rhythm: Normal rate and regular rhythm.      Heart sounds: Normal heart sounds.   Pulmonary:      Effort: Pulmonary effort is normal.      Breath sounds: Normal breath sounds.   Musculoskeletal:      Cervical back: Neck supple.   Skin:     General: Skin is warm and dry.   Neurological:      Mental Status: She is alert and oriented to person, place, and time.   Psychiatric:         Behavior: Behavior normal.         Assessment/Plan   Diagnoses and all orders for this visit:    1. Primary hypertension (Primary)  -     Basic Metabolic Panel; Future    2. Hypercholesterolemia    3. Acquired hypothyroidism  -     Thyroid Cascade Profile; Future    4. Generalized anxiety disorder    Other orders  -     lisinopril (PRINIVIL,ZESTRIL) 10 MG tablet; Take 1 tablet by mouth Daily.  Dispense: 90 tablet; Refill: 1  -     montelukast (SINGULAIR) 10 MG tablet; Take 1 tablet by mouth Every Night.  Dispense: 90 tablet; Refill: 1  -     levothyroxine (SYNTHROID, LEVOTHROID) 50 MCG tablet; Take 1 tablet by mouth Daily.  Dispense: 90 tablet; Refill: 1  -     DULoxetine (CYMBALTA) 60 MG capsule; Take 1 capsule by mouth Daily.  Dispense: 90 capsule; Refill: 1  -     atorvastatin (LIPITOR) 80 MG tablet; Take 1 tablet by mouth Every Night.  Dispense: 90  tablet; Refill: 1        #1 hypertension-blood pressure is mildly elevated in the office.  We will continue current medications.  Information on DASH diet provided.  Follow-up in 6 months, or sooner if problems.  2.  Hyperlipidemia-continue current treatment.  Follow-up in 6 months.  3.  Anxiety/arthritis-continue current treatment.  Follow-up in 6 months.  4.  Hypothyroidism-continue same.  Follow-up in 6 to 12 months.

## 2022-05-05 ENCOUNTER — APPOINTMENT (OUTPATIENT)
Dept: WOMENS IMAGING | Facility: HOSPITAL | Age: 82
End: 2022-05-05

## 2022-05-10 RX ORDER — LEVOTHYROXINE SODIUM 0.05 MG/1
50 TABLET ORAL DAILY
Qty: 90 TABLET | Refills: 1 | Status: SHIPPED | OUTPATIENT
Start: 2022-05-10 | End: 2022-10-19

## 2022-05-11 RX ORDER — DULOXETIN HYDROCHLORIDE 60 MG/1
60 CAPSULE, DELAYED RELEASE ORAL DAILY
Qty: 90 CAPSULE | Refills: 1 | Status: SHIPPED | OUTPATIENT
Start: 2022-05-11 | End: 2022-10-24

## 2022-05-11 RX ORDER — MONTELUKAST SODIUM 10 MG/1
10 TABLET ORAL NIGHTLY
Qty: 90 TABLET | Refills: 1 | Status: SHIPPED | OUTPATIENT
Start: 2022-05-11 | End: 2022-10-24

## 2022-05-11 RX ORDER — LISINOPRIL 10 MG/1
10 TABLET ORAL DAILY
Qty: 90 TABLET | Refills: 1 | Status: SHIPPED | OUTPATIENT
Start: 2022-05-11 | End: 2022-10-24

## 2022-05-20 ENCOUNTER — OFFICE VISIT (OUTPATIENT)
Dept: INTERNAL MEDICINE | Facility: CLINIC | Age: 82
End: 2022-05-20

## 2022-05-20 VITALS
HEIGHT: 65 IN | OXYGEN SATURATION: 95 % | WEIGHT: 154.5 LBS | SYSTOLIC BLOOD PRESSURE: 130 MMHG | TEMPERATURE: 97.7 F | BODY MASS INDEX: 25.74 KG/M2 | HEART RATE: 68 BPM | DIASTOLIC BLOOD PRESSURE: 80 MMHG

## 2022-05-20 DIAGNOSIS — E87.5 HYPERKALEMIA: ICD-10-CM

## 2022-05-20 DIAGNOSIS — E78.00 HYPERCHOLESTEROLEMIA: ICD-10-CM

## 2022-05-20 DIAGNOSIS — I10 PRIMARY HYPERTENSION: Primary | ICD-10-CM

## 2022-05-20 DIAGNOSIS — F41.1 GENERALIZED ANXIETY DISORDER: ICD-10-CM

## 2022-05-20 DIAGNOSIS — E03.9 ACQUIRED HYPOTHYROIDISM: ICD-10-CM

## 2022-05-20 DIAGNOSIS — Z78.0 POSTMENOPAUSAL: ICD-10-CM

## 2022-05-20 PROCEDURE — 99214 OFFICE O/P EST MOD 30 MIN: CPT | Performed by: FAMILY MEDICINE

## 2022-05-20 RX ORDER — ATORVASTATIN CALCIUM 80 MG/1
80 TABLET, FILM COATED ORAL NIGHTLY
Qty: 90 TABLET | Refills: 1 | Status: SHIPPED | OUTPATIENT
Start: 2022-05-20 | End: 2022-08-10

## 2022-05-20 NOTE — PROGRESS NOTES
Subjective   Joanna Ferris is a 81 y.o. female.   Chief Complaint   Patient presents with   • Hypertension   • Hyperlipidemia   • Hypothyroidism   • Anxiety       History of Present Illness     #1 hypertension- patient is on lisinopril 10 mg a day. She takes it everyday.  She has no side effects.  She has no chest pain, no shortness of breath, no lightheadedness.  She exercises daily.  She walks for 1.5 miles a day.  Kidney tests and electrolytes are stable.  Creatinine is at  1.06, GFR 53.  Potassium at 5.5.  She uses no NSAIDs.     #2 hypercholesterolemia- patient is Lipitor 80 mg a day. She takes it everyday. No muscle aches, she has occasional muscle cramps.       #3 arthritis/anxiety-it is localized in hands and knees. On Cymbalta 60 mg a day- It helps with anxiety and arthritis.  It controls her symptoms.  It was started in 2015.  She tried prior to that other medications but none of them worked as well as Cymbalta. Anxiety is controlled.  She does not have excessive worries.  Her mood is normal.  She feels good.  JUAN-7 at 2.  In the past she was treated by rheumatologist.       #4  Hypothyroidism- patient is on levothyroxine at 50 mcg a day.  She takes it every day.  She takes on empty stomach and does not eat for at least an hour after taking it.  TSH at  4.54, normal free T4 and TPA less than 8.    The following portions of the patient's history were reviewed and updated as appropriate: allergies, current medications, past family history, past medical history, past social history, past surgical history and problem list.    Review of Systems   Constitutional: Negative.    Respiratory: Negative for shortness of breath.    Cardiovascular: Negative for chest pain and palpitations.   Neurological: Negative for light-headedness.   Psychiatric/Behavioral: Negative for suicidal ideas.         Objective   Wt Readings from Last 3 Encounters:   05/20/22 70.1 kg (154 lb 8 oz)   11/03/21 68.5 kg (151 lb)   04/29/21  71.7 kg (158 lb)      Vitals:    05/20/22 1129   BP: 130/80   Pulse: 68   Temp: 97.7 °F (36.5 °C)   SpO2: 95%     Temp Readings from Last 3 Encounters:   05/20/22 97.7 °F (36.5 °C)   11/03/21 98.2 °F (36.8 °C)   04/29/21 97.7 °F (36.5 °C)     BP Readings from Last 3 Encounters:   05/20/22 130/80   11/03/21 140/70   04/29/21 132/70     Pulse Readings from Last 3 Encounters:   05/20/22 68   11/03/21 87   04/29/21 71     Body mass index is 25.71 kg/m².    Physical Exam  Constitutional:       Appearance: Normal appearance. She is well-developed.   HENT:      Head: Normocephalic and atraumatic.   Neck:      Thyroid: No thyromegaly.      Vascular: No carotid bruit.   Cardiovascular:      Rate and Rhythm: Normal rate and regular rhythm.      Heart sounds: Normal heart sounds.   Pulmonary:      Effort: Pulmonary effort is normal.      Breath sounds: Normal breath sounds.   Musculoskeletal:      Cervical back: Neck supple.   Skin:     General: Skin is warm and dry.   Neurological:      Mental Status: She is alert and oriented to person, place, and time.   Psychiatric:         Behavior: Behavior normal.         Assessment & Plan   Diagnoses and all orders for this visit:    1. Primary hypertension (Primary)    2. Hyperkalemia  -     Potassium    3. Hypercholesterolemia    4. Acquired hypothyroidism  -     Thyroid Cascade Profile; Future    5. Generalized anxiety disorder    6. Postmenopausal  -     DEXA Bone Density Axial; Future    Other orders  -     atorvastatin (LIPITOR) 80 MG tablet; Take 1 tablet by mouth Every Night.  Dispense: 90 tablet; Refill: 1        1.  Hypertension-continue current treatment.  Follow-up in 6 months.  2.  Hyperkalemia-check potassium today.  3.  Hyperlipidemia-continue current treatment.  Follow-up in 6 months.  4.  Arthritis/anxiety-continue current treatment.  Follow-up in 6 months.  5.  Hypothyroidism-mildly abnormal TSH, will continue current treatment.  Check labs in 3 months.  Follow-up in  6 months.

## 2022-05-24 LAB — POTASSIUM SERPL-SCNC: 5.2 MMOL/L (ref 3.5–5.2)

## 2022-05-27 ENCOUNTER — APPOINTMENT (OUTPATIENT)
Dept: WOMENS IMAGING | Facility: HOSPITAL | Age: 82
End: 2022-05-27

## 2022-05-27 PROCEDURE — 77063 BREAST TOMOSYNTHESIS BI: CPT | Performed by: RADIOLOGY

## 2022-05-27 PROCEDURE — 77067 SCR MAMMO BI INCL CAD: CPT | Performed by: RADIOLOGY

## 2022-07-20 RX ORDER — AZELASTINE HCL 205.5 UG/1
SPRAY NASAL
Qty: 30 ML | Refills: 5 | Status: SHIPPED | OUTPATIENT
Start: 2022-07-20 | End: 2022-07-22 | Stop reason: RX

## 2022-07-22 RX ORDER — AZELASTINE 1 MG/ML
2 SPRAY, METERED NASAL 2 TIMES DAILY
Qty: 1 EACH | Refills: 12 | Status: SHIPPED | OUTPATIENT
Start: 2022-07-22

## 2022-08-10 ENCOUNTER — HOSPITAL ENCOUNTER (OUTPATIENT)
Dept: BONE DENSITY | Facility: HOSPITAL | Age: 82
Discharge: HOME | End: 2022-08-10
Admitting: FAMILY MEDICINE

## 2022-08-10 DIAGNOSIS — Z78.0 POSTMENOPAUSAL: ICD-10-CM

## 2022-08-10 PROCEDURE — 77080 DXA BONE DENSITY AXIAL: CPT

## 2022-08-10 RX ORDER — ATORVASTATIN CALCIUM 80 MG/1
80 TABLET, FILM COATED ORAL NIGHTLY
Qty: 90 TABLET | Refills: 1 | Status: SHIPPED | OUTPATIENT
Start: 2022-08-10 | End: 2022-11-28 | Stop reason: SDUPTHER

## 2022-10-10 ENCOUNTER — FLU SHOT (OUTPATIENT)
Dept: INTERNAL MEDICINE | Facility: CLINIC | Age: 82
End: 2022-10-10

## 2022-10-10 PROCEDURE — 90662 IIV NO PRSV INCREASED AG IM: CPT | Performed by: FAMILY MEDICINE

## 2022-10-10 PROCEDURE — G0008 ADMIN INFLUENZA VIRUS VAC: HCPCS | Performed by: FAMILY MEDICINE

## 2022-10-19 RX ORDER — LEVOTHYROXINE SODIUM 0.05 MG/1
50 TABLET ORAL DAILY
Qty: 90 TABLET | Refills: 1 | Status: SHIPPED | OUTPATIENT
Start: 2022-10-19

## 2022-10-24 RX ORDER — MONTELUKAST SODIUM 10 MG/1
10 TABLET ORAL NIGHTLY
Qty: 90 TABLET | Refills: 1 | Status: SHIPPED | OUTPATIENT
Start: 2022-10-24

## 2022-10-24 RX ORDER — DULOXETIN HYDROCHLORIDE 60 MG/1
60 CAPSULE, DELAYED RELEASE ORAL DAILY
Qty: 90 CAPSULE | Refills: 1 | Status: SHIPPED | OUTPATIENT
Start: 2022-10-24 | End: 2022-11-28 | Stop reason: SDUPTHER

## 2022-10-24 RX ORDER — LISINOPRIL 10 MG/1
10 TABLET ORAL DAILY
Qty: 90 TABLET | Refills: 1 | Status: SHIPPED | OUTPATIENT
Start: 2022-10-24

## 2022-11-21 DIAGNOSIS — E87.5 HYPERKALEMIA: Primary | ICD-10-CM

## 2022-11-21 DIAGNOSIS — E78.00 HYPERCHOLESTEROLEMIA: ICD-10-CM

## 2022-11-22 LAB
ALBUMIN SERPL-MCNC: 4.2 G/DL (ref 3.7–4.7)
ALBUMIN/GLOB SERPL: 2.1 {RATIO} (ref 1.2–2.2)
ALP SERPL-CCNC: 69 IU/L (ref 44–121)
ALT SERPL-CCNC: 19 IU/L (ref 0–32)
AST SERPL-CCNC: 27 IU/L (ref 0–40)
BILIRUB SERPL-MCNC: 0.6 MG/DL (ref 0–1.2)
BUN SERPL-MCNC: 11 MG/DL (ref 8–27)
BUN/CREAT SERPL: 10 (ref 12–28)
CALCIUM SERPL-MCNC: 9.8 MG/DL (ref 8.7–10.3)
CHLORIDE SERPL-SCNC: 102 MMOL/L (ref 96–106)
CHOLEST SERPL-MCNC: 204 MG/DL (ref 100–199)
CO2 SERPL-SCNC: 27 MMOL/L (ref 20–29)
CREAT SERPL-MCNC: 1.11 MG/DL (ref 0.57–1)
EGFRCR SERPLBLD CKD-EPI 2021: 50 ML/MIN/1.73
ERYTHROCYTE [DISTWIDTH] IN BLOOD BY AUTOMATED COUNT: 12.3 % (ref 11.7–15.4)
GLOBULIN SER CALC-MCNC: 2 G/DL (ref 1.5–4.5)
GLUCOSE SERPL-MCNC: 98 MG/DL (ref 70–99)
HCT VFR BLD AUTO: 46.8 % (ref 34–46.6)
HDLC SERPL-MCNC: 80 MG/DL
HGB BLD-MCNC: 15.5 G/DL (ref 11.1–15.9)
LDLC SERPL CALC-MCNC: 105 MG/DL (ref 0–99)
LDLC/HDLC SERPL: 1.3 RATIO (ref 0–3.2)
MCH RBC QN AUTO: 32 PG (ref 26.6–33)
MCHC RBC AUTO-ENTMCNC: 33.1 G/DL (ref 31.5–35.7)
MCV RBC AUTO: 97 FL (ref 79–97)
PLATELET # BLD AUTO: 260 X10E3/UL (ref 150–450)
POTASSIUM SERPL-SCNC: 4.7 MMOL/L (ref 3.5–5.2)
PROT SERPL-MCNC: 6.2 G/DL (ref 6–8.5)
RBC # BLD AUTO: 4.85 X10E6/UL (ref 3.77–5.28)
SODIUM SERPL-SCNC: 143 MMOL/L (ref 134–144)
TRIGL SERPL-MCNC: 110 MG/DL (ref 0–149)
VLDLC SERPL CALC-MCNC: 19 MG/DL (ref 5–40)
WBC # BLD AUTO: 5.7 X10E3/UL (ref 3.4–10.8)

## 2022-11-28 ENCOUNTER — OFFICE VISIT (OUTPATIENT)
Dept: INTERNAL MEDICINE | Facility: CLINIC | Age: 82
End: 2022-11-28

## 2022-11-28 VITALS
SYSTOLIC BLOOD PRESSURE: 126 MMHG | TEMPERATURE: 97.3 F | OXYGEN SATURATION: 99 % | HEIGHT: 65 IN | DIASTOLIC BLOOD PRESSURE: 60 MMHG | HEART RATE: 80 BPM | BODY MASS INDEX: 25.99 KG/M2 | WEIGHT: 156 LBS

## 2022-11-28 DIAGNOSIS — E55.9 VITAMIN D DEFICIENCY: ICD-10-CM

## 2022-11-28 DIAGNOSIS — I10 PRIMARY HYPERTENSION: ICD-10-CM

## 2022-11-28 DIAGNOSIS — Z00.00 MEDICARE ANNUAL WELLNESS VISIT, SUBSEQUENT: Primary | ICD-10-CM

## 2022-11-28 DIAGNOSIS — F41.1 GENERALIZED ANXIETY DISORDER: ICD-10-CM

## 2022-11-28 DIAGNOSIS — E03.9 ACQUIRED HYPOTHYROIDISM: ICD-10-CM

## 2022-11-28 DIAGNOSIS — E78.00 HYPERCHOLESTEROLEMIA: ICD-10-CM

## 2022-11-28 PROCEDURE — G0439 PPPS, SUBSEQ VISIT: HCPCS | Performed by: FAMILY MEDICINE

## 2022-11-28 PROCEDURE — 1160F RVW MEDS BY RX/DR IN RCRD: CPT | Performed by: FAMILY MEDICINE

## 2022-11-28 PROCEDURE — 1170F FXNL STATUS ASSESSED: CPT | Performed by: FAMILY MEDICINE

## 2022-11-28 PROCEDURE — 1159F MED LIST DOCD IN RCRD: CPT | Performed by: FAMILY MEDICINE

## 2022-11-28 PROCEDURE — 1126F AMNT PAIN NOTED NONE PRSNT: CPT | Performed by: FAMILY MEDICINE

## 2022-11-28 PROCEDURE — 99214 OFFICE O/P EST MOD 30 MIN: CPT | Performed by: FAMILY MEDICINE

## 2022-11-28 RX ORDER — DULOXETIN HYDROCHLORIDE 60 MG/1
60 CAPSULE, DELAYED RELEASE ORAL 2 TIMES DAILY
Qty: 180 CAPSULE | Refills: 0 | Status: SHIPPED | OUTPATIENT
Start: 2022-11-28 | End: 2023-01-09 | Stop reason: SDUPTHER

## 2022-11-28 RX ORDER — ATORVASTATIN CALCIUM 80 MG/1
80 TABLET, FILM COATED ORAL NIGHTLY
Qty: 90 TABLET | Refills: 1 | Status: SHIPPED | OUTPATIENT
Start: 2022-11-28

## 2022-11-28 NOTE — PROGRESS NOTES
Subjective   Joanna Ferris is a 82 y.o. female.   Chief Complaint   Patient presents with   • Medicare Wellness-subsequent   • Hypertension   • Hyperlipidemia   • Anxiety       History of Present Illness     #1 hypertension- patient is on lisinopril 10 mg a day. She takes it everyday.  She has no side effects.  She has no chest pain, no shortness of breath, no lightheadedness.  She exercises daily.  She likes to walk for exercise.  She walks her dog.  Kidney tests and electrolytes are stable.  Creatinine is at   1.11, GFR 50.  She uses no NSAIDs.     #2 hypercholesterolemia- patient is Lipitor 80 mg a day. She takes it everyday. No muscle aches, no muscle cramps.   from 92, HDL 50, LFTs normal.      #3 arthritis/anxiety-it is localized in hands and knees. On Cymbalta 60 mg a day- It helps with anxiety and arthritis.  It was started in 2015.  She tried prior to that other medications but none of them worked as well as Cymbalta.   Anxiety is not well controlled.  Her  was diagnosed with cancer.  He is awaiting liver biopsy tomorrow.  She worries, she has little interest in doing things.  No suicidal ideations or aggressive behaviors.  PHQ 9 at 11, JUAN-7 at 13.      The following portions of the patient's history were reviewed and updated as appropriate: allergies, current medications, past family history, past medical history, past social history, past surgical history and problem list.    Review of Systems   Respiratory: Negative for shortness of breath.    Cardiovascular: Negative for chest pain and palpitations.   Musculoskeletal: Negative for myalgias.   Neurological: Negative for light-headedness.   Psychiatric/Behavioral: Negative for suicidal ideas.         Objective   Wt Readings from Last 3 Encounters:   11/28/22 70.8 kg (156 lb)   05/20/22 70.1 kg (154 lb 8 oz)   11/03/21 68.5 kg (151 lb)      Vitals:    11/28/22 0856   BP: 126/60   Pulse: 80   Temp: 97.3 °F (36.3 °C)   SpO2: 99%     Temp  Readings from Last 3 Encounters:   11/28/22 97.3 °F (36.3 °C)   05/20/22 97.7 °F (36.5 °C)   11/03/21 98.2 °F (36.8 °C)     BP Readings from Last 3 Encounters:   11/28/22 126/60   05/20/22 130/80   11/03/21 140/70     Pulse Readings from Last 3 Encounters:   11/28/22 80   05/20/22 68   11/03/21 87     Body mass index is 25.96 kg/m².    Physical Exam  Constitutional:       Appearance: She is well-developed.   Neck:      Thyroid: No thyromegaly.      Vascular: No carotid bruit.   Cardiovascular:      Rate and Rhythm: Normal rate and regular rhythm.      Heart sounds: Normal heart sounds.   Pulmonary:      Effort: Pulmonary effort is normal.      Breath sounds: Normal breath sounds.   Neurological:      Mental Status: She is alert and oriented to person, place, and time.   Psychiatric:         Behavior: Behavior normal.         Assessment & Plan   Diagnoses and all orders for this visit:    1. Medicare annual wellness visit, subsequent (Primary)    2. Primary hypertension  -     Basic Metabolic Panel; Future  -     Urinalysis With Microscopic If Indicated (No Culture) - Urine, Clean Catch; Future    3. Hypercholesterolemia    4. Generalized anxiety disorder    5. Vitamin D deficiency  -     Vitamin D,25-Hydroxy; Future    6. Acquired hypothyroidism  -     Thyroid Cascade Profile; Future    Other orders  -     DULoxetine (CYMBALTA) 60 MG capsule; Take 1 capsule by mouth 2 (Two) Times a Day.  Dispense: 180 capsule; Refill: 0  -     atorvastatin (LIPITOR) 80 MG tablet; Take 1 tablet by mouth Every Night.  Dispense: 90 tablet; Refill: 1        1.  HTN-continue current treatment.  Follow-up in 6 months.  2.  Hyperlipidemia-continue current treatment.  Follow-up in 6 months.  3.  JUAN/osteoarthritis-uncontrolled anxiety, decreased mood.  We are increasing dose of Cymbalta to 60 mg twice a day.  Follow-up in 6 weeks, or sooner if problems.

## 2022-11-28 NOTE — PATIENT INSTRUCTIONS
Medicare Wellness  Personal Prevention Plan of Service     Date of Office Visit:    Encounter Provider:  Ekaterina Santamaria MD  Place of Service:  [facility]  Patient Name: Joanna Ferris  :  1940    As part of the Medicare Wellness portion of your visit today, we are providing you with this personalized preventive plan of services (PPPS). This plan is based upon recommendations of the [place] Preventive Services Task Force (USPSTF) and the Advisory Committee on Immunization Practices (ACIP).    This lists the preventive care services that should be considered, and provides dates of when you are due. Items listed as completed are up-to-date and do not require any further intervention.    Health Maintenance   Topic Date Due    ANNUAL WELLNESS VISIT  2022    COVID-19 Vaccine (5 - Booster) 2022 (Originally 2022)    COLORECTAL CANCER SCREENING  2024 (Originally 1940)    MAMMOGRAM  2023    LIPID PANEL  2023    DXA SCAN  08/10/2024    TDAP/TD VACCINES (3 - Td or Tdap) 2029    INFLUENZA VACCINE  Completed    Pneumococcal Vaccine 65+  Completed    ZOSTER VACCINE  Addressed       No orders of the defined types were placed in this encounter.      Return in about 6 weeks (around 2023).

## 2022-11-28 NOTE — PROGRESS NOTES
The ABCs of the Annual Wellness Visit  Subsequent Medicare Wellness Visit    Chief Complaint   Patient presents with   • Medicare Wellness-subsequent   • Hypertension   • Hyperlipidemia   • Anxiety      Subjective    History of Present Illness:  Joanna Ferris is a 82 y.o. female who presents for a Subsequent Medicare Wellness Visit.    The following portions of the patient's history were reviewed and   updated as appropriate: allergies, current medications, past family history, past medical history, past social history, past surgical history and problem list.    Compared to one year ago, the patient feels her physical   health is worse.  Less physically active.    Compared to one year ago, the patient feels her mental   health is worse.   was lately diagnosed with cancer.  Recent Hospitalizations:  She was not admitted to the hospital during the last year.       Current Medical Providers:  Patient Care Team:  Ekaterina Santamaria MD as PCP - General    Outpatient Medications Prior to Visit   Medication Sig Dispense Refill   • acetaminophen (TYLENOL) 325 MG tablet Take 2 tablets by mouth Every 6 (Six) Hours As Needed for Mild Pain .     • azelastine (ASTELIN) 0.1 % nasal spray 2 sprays into the nostril(s) as directed by provider 2 (Two) Times a Day. Use in each nostril as directed 1 each 12   • Calcium Citrate-Vitamin D (CALCIUM + D PO) Take  by mouth daily.     • Cholecalciferol (VITAMIN D3) 2000 UNITS tablet Take 5,000 Units by mouth Daily.     • Coenzyme Q10 (CO Q10) 100 MG capsule Take 1 capsule by mouth Daily.     • fluticasone (FLONASE) 50 MCG/ACT nasal spray 2 sprays by Each Nare route Daily. 48 g 5   • Folic Acid-B2-B6-B12-C-Choline (Folic Acid XTRA) tablet Take  by mouth.     • Lactobacillus (PROBIOTIC ACIDOPHILUS PO) Take 4 capsules by mouth Daily.     • levothyroxine (SYNTHROID, LEVOTHROID) 50 MCG tablet TAKE 1 TABLET BY MOUTH DAILY 90 tablet 1   • lisinopril (PRINIVIL,ZESTRIL) 10 MG tablet TAKE 1  "TABLET BY MOUTH DAILY 90 tablet 1   • montelukast (SINGULAIR) 10 MG tablet TAKE 1 TABLET BY MOUTH EVERY NIGHT 90 tablet 1   • Naphazoline-Pheniramine (OPCON-A OP) Apply 2 drops to eye(s) as directed by provider Daily.     • Zinc 100 MG tablet Take  by mouth.     • atorvastatin (LIPITOR) 80 MG tablet TAKE 1 TABLET BY MOUTH EVERY NIGHT 90 tablet 1   • DULoxetine (CYMBALTA) 60 MG capsule TAKE 1 CAPSULE BY MOUTH DAILY 90 capsule 1     No facility-administered medications prior to visit.       No opioid medication identified on active medication list. I have reviewed chart for other potential  high risk medication/s and harmful drug interactions in the elderly.          Aspirin is not on active medication list.  Aspirin use is not indicated based on review of current medical condition/s. Risk of harm outweighs potential benefits.  .    Patient Active Problem List   Diagnosis   • CKD (chronic kidney disease) stage 3, GFR 30-59 ml/min (CMS/HCC)   • Allergic conjunctivitis   • Atopic rhinitis   • Arthritis   • Depression   • Generalized anxiety disorder   • Hypercholesterolemia   • Hypertension   • Hypothyroidism   • Osteopenia   • Vitamin D deficiency   • Hypertensive encephalopathy     Advance Care Planning  Advance Directive is on file.  ACP discussion was held with the patient during this visit. Patient has an advance directive in EMR which is still valid.           Objective    Vitals:    11/28/22 0856   BP: 126/60   Pulse: 80   Temp: 97.3 °F (36.3 °C)   SpO2: 99%   Weight: 70.8 kg (156 lb)   Height: 165.1 cm (65\")   PainSc: 0-No pain     Estimated body mass index is 25.96 kg/m² as calculated from the following:    Height as of this encounter: 165.1 cm (65\").    Weight as of this encounter: 70.8 kg (156 lb).    BMI is >= 25 and <30. (Overweight) The following options were offered after discussion;: weight loss educational material (shared in after visit summary)      Does the patient have evidence of cognitive " impairment? No    Physical Exam  Lab Results   Component Value Date    CHLPL 204 (H) 11/21/2022    TRIG 110 11/21/2022    HDL 80 11/21/2022     (H) 11/21/2022    VLDL 19 11/21/2022            HEALTH RISK ASSESSMENT    Smoking Status:  Social History     Tobacco Use   Smoking Status Former   Smokeless Tobacco Never     Alcohol Consumption:  Social History     Substance and Sexual Activity   Alcohol Use Yes   • Alcohol/week: 7.0 standard drinks   • Types: 7 Shots of liquor per week     Fall Risk Screen:    ION Fall Risk Assessment was completed, and patient is at LOW risk for falls.Assessment completed on:11/28/2022    Depression Screening:  PHQ-2/PHQ-9 Depression Screening 11/28/2022   Retired PHQ-9 Total Score -   Retired Total Score -   Little Interest or Pleasure in Doing Things 0-->not at all   Feeling Down, Depressed or Hopeless 0-->not at all   PHQ-9: Brief Depression Severity Measure Score 0       Health Habits and Functional and Cognitive Screening:  Functional & Cognitive Status 11/28/2022   Do you have difficulty preparing food and eating? No   Do you have difficulty bathing yourself, getting dressed or grooming yourself? No   Do you have difficulty using the toilet? No   Do you have difficulty moving around from place to place? No   Do you have trouble with steps or getting out of a bed or a chair? No   Current Diet Well Balanced Diet   Dental Exam Up to date   Eye Exam Up to date   Exercise (times per week) 5 times per week   Current Exercises Include Walking   Current Exercise Activities Include -   Do you need help using the phone?  No   Are you deaf or do you have serious difficulty hearing?  No   Do you need help with transportation? No   Do you need help shopping? No   Do you need help preparing meals?  No   Do you need help with housework?  No   Do you need help with laundry? No   Do you need help taking your medications? No   Do you need help managing money? No   Do you ever drive or ride  in a car without wearing a seat belt? No   Have you felt unusual stress, anger or loneliness in the last month? No   Who do you live with? Spouse   If you need help, do you have trouble finding someone available to you? No   Have you been bothered in the last four weeks by sexual problems? No   Do you have difficulty concentrating, remembering or making decisions? No       Age-appropriate Screening Schedule:  Refer to the list below for future screening recommendations based on patient's age, sex and/or medical conditions. Orders for these recommended tests are listed in the plan section. The patient has been provided with a written plan.    Health Maintenance   Topic Date Due   • MAMMOGRAM  05/27/2023   • LIPID PANEL  11/21/2023   • DXA SCAN  08/10/2024   • TDAP/TD VACCINES (3 - Td or Tdap) 11/01/2029   • INFLUENZA VACCINE  Completed   • ZOSTER VACCINE  Addressed              Assessment & Plan   CMS Preventative Services Quick Reference  Risk Factors Identified During Encounter  Cardiovascular Disease  Obesity/Overweight   The above risks/problems have been discussed with the patient.  Follow up actions/plans if indicated are seen below in the Assessment/Plan Section.  Pertinent information has been shared with the patient in the After Visit Summary.    Information on healthy heart diet provided.  Information on exercise for aging adults provided.  Continue regular dental appointments at least every 6 months.  Patient is up-to-date with cancer screening.  She is up-to-date with vaccinations.  She confirms that living will is up-to-date.    Diagnoses and all orders for this visit:    1. Medicare annual wellness visit, subsequent (Primary)    2. Primary hypertension  -     Basic Metabolic Panel; Future  -     Urinalysis With Microscopic If Indicated (No Culture) - Urine, Clean Catch; Future    3. Hypercholesterolemia    4. Generalized anxiety disorder    5. Vitamin D deficiency  -     Vitamin D,25-Hydroxy;  Future    6. Acquired hypothyroidism  -     Thyroid Cascade Profile; Future    Other orders  -     DULoxetine (CYMBALTA) 60 MG capsule; Take 1 capsule by mouth 2 (Two) Times a Day.  Dispense: 180 capsule; Refill: 0  -     atorvastatin (LIPITOR) 80 MG tablet; Take 1 tablet by mouth Every Night.  Dispense: 90 tablet; Refill: 1        Follow Up:   Return in about 6 weeks (around 1/9/2023).     An After Visit Summary and PPPS were made available to the patient.                      5

## 2023-01-09 ENCOUNTER — OFFICE VISIT (OUTPATIENT)
Dept: INTERNAL MEDICINE | Facility: CLINIC | Age: 83
End: 2023-01-09
Payer: MEDICARE

## 2023-01-09 VITALS
SYSTOLIC BLOOD PRESSURE: 130 MMHG | OXYGEN SATURATION: 98 % | HEART RATE: 66 BPM | WEIGHT: 153 LBS | TEMPERATURE: 98.4 F | DIASTOLIC BLOOD PRESSURE: 70 MMHG | HEIGHT: 65 IN | BODY MASS INDEX: 25.49 KG/M2

## 2023-01-09 DIAGNOSIS — F41.9 ANXIETY AND DEPRESSION: Primary | ICD-10-CM

## 2023-01-09 DIAGNOSIS — F32.A ANXIETY AND DEPRESSION: Primary | ICD-10-CM

## 2023-01-09 PROCEDURE — 99213 OFFICE O/P EST LOW 20 MIN: CPT | Performed by: FAMILY MEDICINE

## 2023-01-09 RX ORDER — DULOXETIN HYDROCHLORIDE 60 MG/1
60 CAPSULE, DELAYED RELEASE ORAL 2 TIMES DAILY
Qty: 180 CAPSULE | Refills: 1 | Status: SHIPPED | OUTPATIENT
Start: 2023-01-09

## 2023-01-09 NOTE — PROGRESS NOTES
Subjective   Joanna Ferris is a 82 y.o. female.   Chief Complaint   Patient presents with   • Depression   • Anxiety       History of Present Illness     1.  Anxiety-at last office visit we increased dose of Cymbalta 60 mg twice a day.  She takes it every day.  She reports improvement.  She is calmer.  She has no side effects.  No dizziness.  No suicidal ideation or aggressive behaviors. PHQ 9 at  4 from 11, JUAN-7 at 1 from 13.    The following portions of the patient's history were reviewed and updated as appropriate: allergies, current medications, past medical history, past social history and problem list.    Review of Systems   Neurological: Negative for dizziness.   Psychiatric/Behavioral: Negative for suicidal ideas.         Objective   Wt Readings from Last 3 Encounters:   01/09/23 69.4 kg (153 lb)   11/28/22 70.8 kg (156 lb)   05/20/22 70.1 kg (154 lb 8 oz)      Vitals:    01/09/23 1136   BP: 130/70   Pulse: 66   Temp: 98.4 °F (36.9 °C)   SpO2: 98%     Temp Readings from Last 3 Encounters:   01/09/23 98.4 °F (36.9 °C)   11/28/22 97.3 °F (36.3 °C)   05/20/22 97.7 °F (36.5 °C)     BP Readings from Last 3 Encounters:   01/09/23 130/70   11/28/22 126/60   05/20/22 130/80     Pulse Readings from Last 3 Encounters:   01/09/23 66   11/28/22 80   05/20/22 68     Body mass index is 25.46 kg/m².    Physical Exam  Constitutional:       Appearance: She is well-developed.   Neck:      Thyroid: No thyromegaly.   Cardiovascular:      Rate and Rhythm: Normal rate and regular rhythm.      Heart sounds: Normal heart sounds.   Pulmonary:      Effort: Pulmonary effort is normal.      Breath sounds: Normal breath sounds.   Skin:     General: Skin is warm and dry.   Neurological:      Mental Status: She is alert and oriented to person, place, and time.   Psychiatric:         Behavior: Behavior normal.         Assessment & Plan   Diagnoses and all orders for this visit:    1. Anxiety and depression (Primary)    Other orders  -      DULoxetine (CYMBALTA) 60 MG capsule; Take 1 capsule by mouth 2 (Two) Times a Day.  Dispense: 180 capsule; Refill: 1        1.  Depression/anxiety-continue current treatment.  Follow-up in 6 months, or sooner if problems.

## 2023-04-11 RX ORDER — LEVOTHYROXINE SODIUM 0.05 MG/1
50 TABLET ORAL DAILY
Qty: 90 TABLET | Refills: 1 | Status: SHIPPED | OUTPATIENT
Start: 2023-04-11

## 2023-04-12 RX ORDER — LISINOPRIL 10 MG/1
10 TABLET ORAL DAILY
Qty: 90 TABLET | Refills: 1 | Status: SHIPPED | OUTPATIENT
Start: 2023-04-12

## 2023-04-12 RX ORDER — MONTELUKAST SODIUM 10 MG/1
10 TABLET ORAL NIGHTLY
Qty: 90 TABLET | Refills: 1 | Status: SHIPPED | OUTPATIENT
Start: 2023-04-12

## 2023-08-30 ENCOUNTER — APPOINTMENT (OUTPATIENT)
Dept: WOMENS IMAGING | Facility: HOSPITAL | Age: 83
End: 2023-08-30
Payer: MEDICARE

## 2023-08-30 PROCEDURE — 77067 SCR MAMMO BI INCL CAD: CPT | Performed by: RADIOLOGY

## 2023-08-30 PROCEDURE — 77063 BREAST TOMOSYNTHESIS BI: CPT | Performed by: RADIOLOGY

## 2023-09-29 ENCOUNTER — FLU SHOT (OUTPATIENT)
Dept: INTERNAL MEDICINE | Facility: CLINIC | Age: 83
End: 2023-09-29
Payer: MEDICARE

## 2023-09-29 DIAGNOSIS — Z23 NEED FOR INFLUENZA VACCINATION: Primary | ICD-10-CM

## 2023-09-29 PROCEDURE — 90662 IIV NO PRSV INCREASED AG IM: CPT | Performed by: FAMILY MEDICINE

## 2023-09-29 PROCEDURE — G0008 ADMIN INFLUENZA VIRUS VAC: HCPCS | Performed by: FAMILY MEDICINE

## 2023-11-16 RX ORDER — LEVOTHYROXINE SODIUM 0.05 MG/1
50 TABLET ORAL DAILY
Qty: 90 TABLET | Refills: 0 | Status: SHIPPED | OUTPATIENT
Start: 2023-11-16

## 2023-12-21 RX ORDER — LISINOPRIL 10 MG/1
10 TABLET ORAL DAILY
Qty: 90 TABLET | Refills: 1 | Status: SHIPPED | OUTPATIENT
Start: 2023-12-21

## 2024-01-10 DIAGNOSIS — E78.00 HYPERCHOLESTEROLEMIA: ICD-10-CM

## 2024-01-10 DIAGNOSIS — E03.9 ACQUIRED HYPOTHYROIDISM: Primary | ICD-10-CM

## 2024-01-12 LAB
ALBUMIN SERPL-MCNC: 4.4 G/DL (ref 3.7–4.7)
ALBUMIN/GLOB SERPL: 2.2 {RATIO} (ref 1.2–2.2)
ALP SERPL-CCNC: 63 IU/L (ref 44–121)
ALT SERPL-CCNC: 26 IU/L (ref 0–32)
AST SERPL-CCNC: 29 IU/L (ref 0–40)
BILIRUB SERPL-MCNC: 0.4 MG/DL (ref 0–1.2)
BUN SERPL-MCNC: 19 MG/DL (ref 8–27)
BUN/CREAT SERPL: 17 (ref 12–28)
CALCIUM SERPL-MCNC: 9.9 MG/DL (ref 8.7–10.3)
CHLORIDE SERPL-SCNC: 100 MMOL/L (ref 96–106)
CHOLEST SERPL-MCNC: 196 MG/DL (ref 100–199)
CO2 SERPL-SCNC: 26 MMOL/L (ref 20–29)
CREAT SERPL-MCNC: 1.14 MG/DL (ref 0.57–1)
EGFRCR SERPLBLD CKD-EPI 2021: 48 ML/MIN/1.73
GLOBULIN SER CALC-MCNC: 2 G/DL (ref 1.5–4.5)
GLUCOSE SERPL-MCNC: 91 MG/DL (ref 70–99)
HDLC SERPL-MCNC: 92 MG/DL
LDLC SERPL CALC-MCNC: 91 MG/DL (ref 0–99)
LDLC/HDLC SERPL: 1 RATIO (ref 0–3.2)
POTASSIUM SERPL-SCNC: 5.1 MMOL/L (ref 3.5–5.2)
PROT SERPL-MCNC: 6.4 G/DL (ref 6–8.5)
SODIUM SERPL-SCNC: 141 MMOL/L (ref 134–144)
TRIGL SERPL-MCNC: 71 MG/DL (ref 0–149)
TSH SERPL DL<=0.005 MIU/L-ACNC: 4.2 UIU/ML (ref 0.45–4.5)
VLDLC SERPL CALC-MCNC: 13 MG/DL (ref 5–40)

## 2024-01-17 ENCOUNTER — OFFICE VISIT (OUTPATIENT)
Dept: INTERNAL MEDICINE | Facility: CLINIC | Age: 84
End: 2024-01-17
Payer: MEDICARE

## 2024-01-17 ENCOUNTER — HOSPITAL ENCOUNTER (OUTPATIENT)
Dept: GENERAL RADIOLOGY | Facility: HOSPITAL | Age: 84
Discharge: HOME | End: 2024-01-17
Admitting: FAMILY MEDICINE
Payer: MEDICARE

## 2024-01-17 VITALS
DIASTOLIC BLOOD PRESSURE: 70 MMHG | TEMPERATURE: 97.3 F | HEART RATE: 72 BPM | SYSTOLIC BLOOD PRESSURE: 118 MMHG | BODY MASS INDEX: 24.66 KG/M2 | WEIGHT: 148 LBS | HEIGHT: 65 IN | OXYGEN SATURATION: 98 %

## 2024-01-17 DIAGNOSIS — F43.21 GRIEVING: ICD-10-CM

## 2024-01-17 DIAGNOSIS — M54.2 NECK PAIN: ICD-10-CM

## 2024-01-17 DIAGNOSIS — E03.9 ACQUIRED HYPOTHYROIDISM: ICD-10-CM

## 2024-01-17 DIAGNOSIS — R00.2 PALPITATIONS: ICD-10-CM

## 2024-01-17 DIAGNOSIS — F51.02 ADJUSTMENT INSOMNIA: ICD-10-CM

## 2024-01-17 DIAGNOSIS — I10 PRIMARY HYPERTENSION: Primary | ICD-10-CM

## 2024-01-17 DIAGNOSIS — E78.00 HYPERCHOLESTEROLEMIA: ICD-10-CM

## 2024-01-17 PROCEDURE — 3078F DIAST BP <80 MM HG: CPT | Performed by: FAMILY MEDICINE

## 2024-01-17 PROCEDURE — 1159F MED LIST DOCD IN RCRD: CPT | Performed by: FAMILY MEDICINE

## 2024-01-17 PROCEDURE — 1170F FXNL STATUS ASSESSED: CPT | Performed by: FAMILY MEDICINE

## 2024-01-17 PROCEDURE — 1160F RVW MEDS BY RX/DR IN RCRD: CPT | Performed by: FAMILY MEDICINE

## 2024-01-17 PROCEDURE — 72040 X-RAY EXAM NECK SPINE 2-3 VW: CPT

## 2024-01-17 PROCEDURE — 3074F SYST BP LT 130 MM HG: CPT | Performed by: FAMILY MEDICINE

## 2024-01-17 RX ORDER — LEVOTHYROXINE SODIUM 0.05 MG/1
50 TABLET ORAL DAILY
Qty: 90 TABLET | Refills: 3 | Status: SHIPPED | OUTPATIENT
Start: 2024-01-17

## 2024-01-17 RX ORDER — TRAZODONE HYDROCHLORIDE 50 MG/1
50 TABLET ORAL NIGHTLY
Qty: 30 TABLET | Refills: 2 | Status: SHIPPED | OUTPATIENT
Start: 2024-01-17

## 2024-01-17 RX ORDER — DULOXETIN HYDROCHLORIDE 60 MG/1
60 CAPSULE, DELAYED RELEASE ORAL 2 TIMES DAILY
Qty: 180 CAPSULE | Refills: 1 | Status: SHIPPED | OUTPATIENT
Start: 2024-01-17

## 2024-01-17 RX ORDER — ATORVASTATIN CALCIUM 80 MG/1
80 TABLET, FILM COATED ORAL NIGHTLY
Qty: 90 TABLET | Refills: 1 | Status: SHIPPED | OUTPATIENT
Start: 2024-01-17

## 2024-01-17 NOTE — PROGRESS NOTES
Subjective   Joanna Ferris is a 83 y.o. female.   Chief Complaint   Patient presents with    Neck Pain     Fall  11/15/2023    Hyperlipidemia    Anxiety    Palpitations    Hypothyroidism    Insomnia    Hypertension       History of Present Illness      Anxiety/arthritis/insomnia-  it is localized in hands and knees. On Cymbalta 60 mg twice a day-it helps with arthritis and anxiety.  It was started in .  She tried prior to that other medications but none of them worked as well as Cymbalta. She takes it every day.  She has no side effects.    She is grieving.  Georgi  on Thanksgiving day.  She has very supportive family.  She did not do grieving counseling yet.  She has problems with sleeping.  She wakes up frequently.  PHQ 9 at 12 JUAN-7 at 8.     Hypertension/palpitations patient is on lisinopril 10 mg a day. She takes it everyday.  She has no side effects.  She has no chest pain, no shortness of breath, no lightheadedness.  She has palpitations.  They started about 1 to 2 weeks ago.  She feels her heart beating fast, but not irregularly.  It lasts for about 10 minutes.  She has no other symptoms associated with it.  Kidney tests and electrolytes are stable.  Creatinine is at   1.14, GFR 48.  Stable.     Hypercholesterolemia- on Lipitor 80 mg a day. She takes it everyday.  No side effects.  LDL 91, HDL 92.  LFTs normal      Hypothyroidism- patient is on levothyroxine at 50 mcg a day.  She takes it every day.  She takes on empty stomach and does not eat for at least an hour after taking it.  TSH at 4.2.    Neck pain-she fell forward in November.  Since then she has neck pain.  It is right-sided.  It is on and off, dull pain.  Intensity 3 out of 4.  Worse with turning head to the right.  She hears crackling noises in the neck.  There is no numbness or tingling associated.  She noticed that her right hand is a little shaky when she raises.  It started after she fell.        Review of Systems   Respiratory:   Negative for shortness of breath.    Cardiovascular:  Positive for palpitations. Negative for chest pain.   Musculoskeletal:  Positive for neck pain.   Neurological:  Negative for light-headedness and numbness.   Psychiatric/Behavioral:  Negative for suicidal ideas.          Objective   Wt Readings from Last 3 Encounters:   01/17/24 67.1 kg (148 lb)   07/17/23 69.4 kg (153 lb 1.6 oz)   01/09/23 69.4 kg (153 lb)      Vitals:    01/17/24 1427   BP: 118/70   Pulse: 72   Temp: 97.3 °F (36.3 °C)   SpO2: 98%     Temp Readings from Last 3 Encounters:   01/17/24 97.3 °F (36.3 °C)   07/17/23 97.6 °F (36.4 °C)   01/09/23 98.4 °F (36.9 °C)     BP Readings from Last 3 Encounters:   01/17/24 118/70   07/17/23 124/70   01/09/23 130/70     Pulse Readings from Last 3 Encounters:   01/17/24 72   07/17/23 70   01/09/23 66     Body mass index is 24.63 kg/m².    Physical Exam  Constitutional:       Appearance: She is well-developed.   Neck:      Thyroid: No thyromegaly.      Vascular: No carotid bruit.   Cardiovascular:      Rate and Rhythm: Normal rate and regular rhythm.      Heart sounds: Normal heart sounds. No murmur heard.  Pulmonary:      Effort: Pulmonary effort is normal.      Breath sounds: Normal breath sounds.   Musculoskeletal:      Comments: No TTP over C-spine or paraspinal muscles.  Limited rotation in the neck.   Skin:     General: Skin is warm and dry.   Neurological:      Mental Status: She is alert.   Psychiatric:         Behavior: Behavior normal.         Assessment & Plan   Diagnoses and all orders for this visit:    1. Primary hypertension (Primary)    2. Hypercholesterolemia    3. Acquired hypothyroidism    4. Adjustment insomnia    5. Grieving    6. Palpitations  -     Ambulatory Referral to Cardiology    7. Neck pain  -     Ambulatory Referral to Physical Therapy  -     XR Spine Cervical 2 or 3 View    Other orders  -     traZODone (DESYREL) 50 MG tablet; Take 1 tablet by mouth Every Night.  Dispense: 30  tablet; Refill: 2  -     levothyroxine (SYNTHROID, LEVOTHROID) 50 MCG tablet; Take 1 tablet by mouth Daily.  Dispense: 90 tablet; Refill: 3  -     DULoxetine (CYMBALTA) 60 MG capsule; Take 1 capsule by mouth 2 (Two) Times a Day.  Dispense: 180 capsule; Refill: 1  -     atorvastatin (LIPITOR) 80 MG tablet; Take 1 tablet by mouth Every Night.  Dispense: 90 tablet; Refill: 1      Hypertension-continue current treatment.  Follow-up in 6 months.    Hyperlipidemia-continue current treatment.  Follow-up in 6 months.    Arthritis/anxiety-continue current treatment.  Follow-up in 6 months.    Grieving/insomnia-advised grieving counseling.  We are starting trazodone 50 mg at bedtime for insomnia.  Follow-up in 2 to 3 months.    Palpitations-EKG in the office negative.  Interpretation and tracing will be scanned.  Normal TSH.  Referral to cardiologist.    Neck pain-we are checking x-ray.  I am referring patient to physical therapy.  Follow-up in 2 to 3 months, or sooner if problems.

## 2024-02-06 ENCOUNTER — TELEPHONE (OUTPATIENT)
Dept: INTERNAL MEDICINE | Facility: CLINIC | Age: 84
End: 2024-02-06
Payer: MEDICARE

## 2024-02-14 ENCOUNTER — TELEPHONE (OUTPATIENT)
Dept: INTERNAL MEDICINE | Facility: CLINIC | Age: 84
End: 2024-02-14
Payer: MEDICARE

## 2024-03-14 RX ORDER — MONTELUKAST SODIUM 10 MG/1
10 TABLET ORAL NIGHTLY
Qty: 90 TABLET | Refills: 1 | Status: SHIPPED | OUTPATIENT
Start: 2024-03-14

## 2024-04-17 ENCOUNTER — OFFICE VISIT (OUTPATIENT)
Dept: INTERNAL MEDICINE | Facility: CLINIC | Age: 84
End: 2024-04-17
Payer: MEDICARE

## 2024-04-17 VITALS
WEIGHT: 146 LBS | BODY MASS INDEX: 24.32 KG/M2 | TEMPERATURE: 98.2 F | OXYGEN SATURATION: 98 % | HEIGHT: 65 IN | SYSTOLIC BLOOD PRESSURE: 122 MMHG | HEART RATE: 74 BPM | DIASTOLIC BLOOD PRESSURE: 68 MMHG

## 2024-04-17 DIAGNOSIS — F51.02 ADJUSTMENT INSOMNIA: ICD-10-CM

## 2024-04-17 DIAGNOSIS — I10 PRIMARY HYPERTENSION: Primary | ICD-10-CM

## 2024-04-17 DIAGNOSIS — F32.A ANXIETY AND DEPRESSION: ICD-10-CM

## 2024-04-17 DIAGNOSIS — M54.2 NECK PAIN: ICD-10-CM

## 2024-04-17 DIAGNOSIS — E03.9 ACQUIRED HYPOTHYROIDISM: ICD-10-CM

## 2024-04-17 DIAGNOSIS — F41.9 ANXIETY AND DEPRESSION: ICD-10-CM

## 2024-04-17 DIAGNOSIS — E78.00 HYPERCHOLESTEROLEMIA: ICD-10-CM

## 2024-04-17 PROCEDURE — 3078F DIAST BP <80 MM HG: CPT | Performed by: FAMILY MEDICINE

## 2024-04-17 PROCEDURE — 3074F SYST BP LT 130 MM HG: CPT | Performed by: FAMILY MEDICINE

## 2024-04-17 PROCEDURE — 1160F RVW MEDS BY RX/DR IN RCRD: CPT | Performed by: FAMILY MEDICINE

## 2024-04-17 PROCEDURE — 1159F MED LIST DOCD IN RCRD: CPT | Performed by: FAMILY MEDICINE

## 2024-04-17 PROCEDURE — 99214 OFFICE O/P EST MOD 30 MIN: CPT | Performed by: FAMILY MEDICINE

## 2024-04-17 RX ORDER — LISINOPRIL 10 MG/1
10 TABLET ORAL DAILY
Qty: 90 TABLET | Refills: 1 | Status: SHIPPED | OUTPATIENT
Start: 2024-04-17

## 2024-04-17 NOTE — PROGRESS NOTES
Subjective   Joanna Ferris is a 83 y.o. female.   Chief Complaint   Patient presents with    Depression    Neck Pain       History of Present Illness     Insomnia-situational.  I prescribed at last office visit trazodone.  Patient did not pick it up from the pharmacy.  She states that she sleeps better.  She gets about 6 hours of sleep.  Sometimes she wakes up rested, sometimes not.  At this point she does not feel that she needs any medications to help with sleep.  But she would like to keep trazodone available as needed.    Anxiety/arthritis-  it is localized in hands and knees. On Cymbalta 60 mg twice a day-it helps with arthritis and anxiety.  It was started in 2015.  She tried prior to that other medications but none of them worked as well as Cymbalta. She takes it every day.  She has no side effects.    She is grieving. She has very supportive family.  Good friends.  She prefers to be alone, but she goes see her friends and family.  She goes for walks with her dog.  No suicidal ideations.  PHQ 9 at 16 from 12 JUAN-7 at 6 from 8.    Hypertension/palpitations patient is on lisinopril 10 mg a day. She takes it everyday.  She has no side effects.  She has no chest pain, no shortness of breath, no lightheadedness.  She has no more palpitations.  At last office visit we referred her to cardiologist, but the symptoms resolved and she did not go.  She has no more palpitations.  Creatinine 1.14, GFR 48.  She uses no NSAIDs.  No urinary symptoms.  No blood seen in urine.     Hypercholesterolemia- on Lipitor 80 mg a day. She takes it everyday.  No side effects.  LDL 91, HDL 92.  LFTs normal      Hypothyroidism- patient is on levothyroxine at 50 mcg a day.  She takes it every day.  She takes on empty stomach and does not eat for at least an hour after taking it.  TSH at 4.2.     Neck pain-will check an x-ray after last appointment.  Referred patient to physical therapy.  She does home exercise program.  It is  better.    Review of Systems   Respiratory: Negative.  Negative for shortness of breath.    Cardiovascular: Negative.  Negative for chest pain and palpitations.   Genitourinary:  Negative for dysuria, hematuria and urgency.   Neurological:  Negative for light-headedness.   Psychiatric/Behavioral:  Negative for suicidal ideas.          Objective   Wt Readings from Last 3 Encounters:   04/17/24 66.2 kg (146 lb)   01/17/24 67.1 kg (148 lb)   07/17/23 69.4 kg (153 lb 1.6 oz)      Vitals:    04/17/24 1350   BP: 122/68   Pulse: 74   Temp: 98.2 °F (36.8 °C)   SpO2: 98%     Temp Readings from Last 3 Encounters:   04/17/24 98.2 °F (36.8 °C)   01/17/24 97.3 °F (36.3 °C)   07/17/23 97.6 °F (36.4 °C)     BP Readings from Last 3 Encounters:   04/17/24 122/68   01/17/24 118/70   07/17/23 124/70     Pulse Readings from Last 3 Encounters:   04/17/24 74   01/17/24 72   07/17/23 70     Body mass index is 24.3 kg/m².    Physical Exam  Constitutional:       Appearance: She is well-developed.   Neck:      Thyroid: No thyromegaly.      Vascular: No carotid bruit.   Cardiovascular:      Rate and Rhythm: Normal rate and regular rhythm.      Heart sounds: Normal heart sounds.   Pulmonary:      Effort: Pulmonary effort is normal.      Breath sounds: Normal breath sounds.   Skin:     General: Skin is warm and dry.   Neurological:      Mental Status: She is alert and oriented to person, place, and time.   Psychiatric:         Behavior: Behavior normal.         Assessment & Plan   Diagnoses and all orders for this visit:    1. Primary hypertension (Primary)    2. Hypercholesterolemia    3. Acquired hypothyroidism    4. Adjustment insomnia    5. Neck pain    6. Anxiety and depression    Other orders  -     lisinopril (PRINIVIL,ZESTRIL) 10 MG tablet; Take 1 tablet by mouth Daily.  Dispense: 90 tablet; Refill: 1        Depression/grieving-continue current treatment.  Follow-up in 6 months, or sooner if problems.    Insomnia-trazodone as needed.   Follow-up in 6 months.    Hypertension-continue current treatment.  Follow-up in 6 months.    Hyperlipidemia-continue current treatment.  Follow-up in 6 months.    Hypothyroidism-continue current treatment follow-up in 12 months.      Neck pain-better.  I encourage patient to try physical therapy.  Referral should be still active.  If not she will let me know.

## 2024-04-29 RX ORDER — LEVOTHYROXINE SODIUM 0.05 MG/1
50 TABLET ORAL DAILY
Qty: 90 TABLET | Refills: 3 | Status: SHIPPED | OUTPATIENT
Start: 2024-04-29

## 2024-05-29 RX ORDER — FLUTICASONE PROPIONATE 50 MCG
2 SPRAY, SUSPENSION (ML) NASAL DAILY
Qty: 48 G | Refills: 5 | Status: SHIPPED | OUTPATIENT
Start: 2024-05-29

## 2024-05-29 NOTE — TELEPHONE ENCOUNTER
Caller: Joanna Ferris    Relationship: Self    Best call back number: [phone]     Requested Prescriptions:   Requested Prescriptions     Pending Prescriptions Disp Refills    fluticasone (FLONASE) 50 MCG/ACT nasal spray 48 g 5     Si sprays by Each Nare route Daily.        Pharmacy where request should be sent: [facility] #[id number] [place] [id number] [facility] - [phone]  - [phone] FX     Last office visit with prescribing clinician: 2024   Last telemedicine visit with prescribing clinician: Visit date not found   Next office visit with prescribing clinician: 10/23/2024     Additional details provided by patient: PATIENT STATED SHE HAS BEEN OUT OF THIS MEDICATION FOR A WEEK.    Does the patient have less than a 3 day supply:  [x] Yes  [] No    Would you like a call back once the refill request has been completed: [] Yes [x] No    If the office needs to give you a call back, can they leave a voicemail: [] Yes [x] No    Melissa Dewitt Rep   24 10:47 EDT

## 2024-07-18 RX ORDER — DULOXETIN HYDROCHLORIDE 60 MG/1
60 CAPSULE, DELAYED RELEASE ORAL 2 TIMES DAILY
Qty: 180 CAPSULE | Refills: 1 | Status: SHIPPED | OUTPATIENT
Start: 2024-07-18

## 2024-07-24 RX ORDER — LISINOPRIL 10 MG/1
10 TABLET ORAL DAILY
Qty: 90 TABLET | Refills: 1 | Status: SHIPPED | OUTPATIENT
Start: 2024-07-24

## 2024-08-26 RX ORDER — MONTELUKAST SODIUM 10 MG/1
10 TABLET ORAL NIGHTLY
Qty: 90 TABLET | Refills: 1 | Status: SHIPPED | OUTPATIENT
Start: 2024-08-26

## 2024-09-12 NOTE — TELEPHONE ENCOUNTER
Caller: Joanna Ferris    Relationship: Self    Best call back number: [phone]     Requested Prescriptions:   Requested Prescriptions     Pending Prescriptions Disp Refills    fluticasone (FLONASE) 50 MCG/ACT nasal spray 48 g 5     Si sprays by Each Nare route Daily.        Pharmacy where request should be sent: [facility] #[id number] [place] [id number] [facility] AT [facility] - [phone]  - [phone] FX     Last office visit with prescribing clinician: 2024   Last telemedicine visit with prescribing clinician: Visit date not found   Next office visit with prescribing clinician: 10/23/2024     Additional details provided by patient: PATIENT STATED SHE HAS BEEN OUT OF THIS MEDICATION FOR A WEEK.    Does the patient have less than a 3 day supply:  [x] Yes  [] No    Would you like a call back once the refill request has been completed: [] Yes [x] No    If the office needs to give you a call back, can they leave a voicemail: [] Yes [x] No    Melissa Dewitt Rep   24 14:08 EDT

## 2024-09-13 RX ORDER — FLUTICASONE PROPIONATE 50 MCG
2 SPRAY, SUSPENSION (ML) NASAL DAILY
Qty: 48 G | Refills: 5 | Status: SHIPPED | OUTPATIENT
Start: 2024-09-13

## 2024-10-15 DIAGNOSIS — E78.00 HYPERCHOLESTEROLEMIA: Primary | ICD-10-CM

## 2024-10-17 LAB
ALBUMIN SERPL-MCNC: 4.4 G/DL (ref 3.7–4.7)
ALP SERPL-CCNC: 59 IU/L (ref 44–121)
ALT SERPL-CCNC: 30 IU/L (ref 0–32)
AST SERPL-CCNC: 34 IU/L (ref 0–40)
BILIRUB SERPL-MCNC: 0.6 MG/DL (ref 0–1.2)
BUN SERPL-MCNC: 18 MG/DL (ref 8–27)
BUN/CREAT SERPL: 20 (ref 12–28)
CALCIUM SERPL-MCNC: 9.8 MG/DL (ref 8.7–10.3)
CHLORIDE SERPL-SCNC: 101 MMOL/L (ref 96–106)
CHOLEST SERPL-MCNC: 178 MG/DL (ref 100–199)
CO2 SERPL-SCNC: 27 MMOL/L (ref 20–29)
CREAT SERPL-MCNC: 0.9 MG/DL (ref 0.57–1)
EGFRCR SERPLBLD CKD-EPI 2021: 64 ML/MIN/1.73
GLOBULIN SER CALC-MCNC: 2 G/DL (ref 1.5–4.5)
GLUCOSE SERPL-MCNC: 107 MG/DL (ref 70–99)
HDLC SERPL-MCNC: 86 MG/DL
LDLC SERPL CALC-MCNC: 78 MG/DL (ref 0–99)
LDLC/HDLC SERPL: 0.9 RATIO (ref 0–3.2)
POTASSIUM SERPL-SCNC: 4.7 MMOL/L (ref 3.5–5.2)
PROT SERPL-MCNC: 6.4 G/DL (ref 6–8.5)
SODIUM SERPL-SCNC: 140 MMOL/L (ref 134–144)
TRIGL SERPL-MCNC: 75 MG/DL (ref 0–149)
VLDLC SERPL CALC-MCNC: 14 MG/DL (ref 5–40)

## 2024-10-21 RX ORDER — ATORVASTATIN CALCIUM 80 MG/1
80 TABLET, FILM COATED ORAL NIGHTLY
Qty: 90 TABLET | Refills: 1 | Status: SHIPPED | OUTPATIENT
Start: 2024-10-21 | End: 2024-10-23 | Stop reason: SDUPTHER

## 2024-10-23 ENCOUNTER — OFFICE VISIT (OUTPATIENT)
Dept: INTERNAL MEDICINE | Facility: CLINIC | Age: 84
End: 2024-10-23
Payer: MEDICARE

## 2024-10-23 VITALS
SYSTOLIC BLOOD PRESSURE: 110 MMHG | WEIGHT: 147.6 LBS | HEART RATE: 70 BPM | DIASTOLIC BLOOD PRESSURE: 64 MMHG | HEIGHT: 65 IN | BODY MASS INDEX: 24.59 KG/M2 | OXYGEN SATURATION: 96 %

## 2024-10-23 DIAGNOSIS — F41.9 ANXIETY AND DEPRESSION: ICD-10-CM

## 2024-10-23 DIAGNOSIS — E78.00 HYPERCHOLESTEROLEMIA: ICD-10-CM

## 2024-10-23 DIAGNOSIS — F32.A ANXIETY AND DEPRESSION: ICD-10-CM

## 2024-10-23 DIAGNOSIS — F51.02 ADJUSTMENT INSOMNIA: ICD-10-CM

## 2024-10-23 DIAGNOSIS — Z23 NEED FOR INFLUENZA VACCINATION: ICD-10-CM

## 2024-10-23 DIAGNOSIS — Z12.11 SCREENING FOR COLON CANCER: ICD-10-CM

## 2024-10-23 DIAGNOSIS — I10 PRIMARY HYPERTENSION: Primary | ICD-10-CM

## 2024-10-23 PROCEDURE — 99214 OFFICE O/P EST MOD 30 MIN: CPT | Performed by: FAMILY MEDICINE

## 2024-10-23 PROCEDURE — 1125F AMNT PAIN NOTED PAIN PRSNT: CPT | Performed by: FAMILY MEDICINE

## 2024-10-23 PROCEDURE — 1160F RVW MEDS BY RX/DR IN RCRD: CPT | Performed by: FAMILY MEDICINE

## 2024-10-23 PROCEDURE — 3078F DIAST BP <80 MM HG: CPT | Performed by: FAMILY MEDICINE

## 2024-10-23 PROCEDURE — 3074F SYST BP LT 130 MM HG: CPT | Performed by: FAMILY MEDICINE

## 2024-10-23 PROCEDURE — G0008 ADMIN INFLUENZA VIRUS VAC: HCPCS | Performed by: FAMILY MEDICINE

## 2024-10-23 PROCEDURE — 90662 IIV NO PRSV INCREASED AG IM: CPT | Performed by: FAMILY MEDICINE

## 2024-10-23 PROCEDURE — 1159F MED LIST DOCD IN RCRD: CPT | Performed by: FAMILY MEDICINE

## 2024-10-23 RX ORDER — DULOXETIN HYDROCHLORIDE 60 MG/1
60 CAPSULE, DELAYED RELEASE ORAL 2 TIMES DAILY
Qty: 180 CAPSULE | Refills: 1 | Status: SHIPPED | OUTPATIENT
Start: 2024-10-23

## 2024-10-23 RX ORDER — LISINOPRIL 10 MG/1
10 TABLET ORAL DAILY
Qty: 90 TABLET | Refills: 1 | Status: SHIPPED | OUTPATIENT
Start: 2024-10-23

## 2024-10-23 RX ORDER — CYCLOSPORINE 0.5 MG/ML
EMULSION OPHTHALMIC
COMMUNITY
Start: 2024-08-13

## 2024-10-23 RX ORDER — TRAZODONE HYDROCHLORIDE 50 MG/1
50 TABLET, FILM COATED ORAL NIGHTLY
Qty: 30 TABLET | Refills: 2 | Status: SHIPPED | OUTPATIENT
Start: 2024-10-23

## 2024-10-23 RX ORDER — ATORVASTATIN CALCIUM 80 MG/1
80 TABLET, FILM COATED ORAL NIGHTLY
Qty: 90 TABLET | Refills: 1 | Status: SHIPPED | OUTPATIENT
Start: 2024-10-23

## 2024-10-23 NOTE — PROGRESS NOTES
Subjective   Joanna Ferris is a 83 y.o. female.   Chief Complaint   Patient presents with    Hypertension    Hyperlipidemia    Hypothyroidism    Anxiety    Depression       History of Present Illness      Anxiety/arthritis-  it is localized in hands and knees. On Cymbalta 60 mg twice a day-it helps with arthritis and anxiety.  It was started in 2015.  She tried prior to that other medications but none of them worked as well as Cymbalta. She takes it every day.  She has no side effects.    She feels better.  She goes for walks every day for 20 minutes.  She takes care of things at home.  She has problems with falling asleep.  Takes about 2 hours to fall asleep.  She goes to bed around 8:00PM and gets up at 8 AM.  PHQ 9 at 6 from 16 from 12 JUAN-7 at 0 from 6 from 8.     Hypertension/palpitations patient is on lisinopril 10 mg a day. She takes it everyday.  She has no side effects.  She has no chest pain, no shortness of breath, no lightheadedness, no palpitations.  Creatinine 0.9 from 1.14, GFR 64 from 48.  She uses no NSAIDs.  No urinary symptoms.  No blood seen in urine.      Hypercholesterolemia- on Lipitor 80 mg a day. She takes it everyday.  No side effects.  LDL 78 from 91, HDL 86 from 92.  LFTs normal      Review of Systems   Respiratory:  Negative for shortness of breath.    Cardiovascular:  Negative for chest pain and palpitations.   Neurological:  Negative for light-headedness.   Psychiatric/Behavioral:  Negative for suicidal ideas.          Objective   Wt Readings from Last 3 Encounters:   10/23/24 67 kg (147 lb 9.6 oz)   04/17/24 66.2 kg (146 lb)   01/17/24 67.1 kg (148 lb)      Vitals:    10/23/24 1308   BP: 110/64     Temp Readings from Last 3 Encounters:   04/17/24 98.2 °F (36.8 °C)   01/17/24 97.3 °F (36.3 °C)   07/17/23 97.6 °F (36.4 °C)     BP Readings from Last 3 Encounters:   10/23/24 110/64   04/17/24 122/68   01/17/24 118/70     Pulse Readings from Last 3 Encounters:   04/17/24 74   01/17/24 72    07/17/23 70     Body mass index is 24.56 kg/m².    Physical Exam  Constitutional:       Appearance: She is well-developed.   Neck:      Vascular: No carotid bruit.   Cardiovascular:      Rate and Rhythm: Normal rate and regular rhythm.      Heart sounds: Normal heart sounds.   Pulmonary:      Effort: Pulmonary effort is normal.      Breath sounds: Normal breath sounds.   Skin:     General: Skin is warm and dry.   Neurological:      Mental Status: She is alert.   Psychiatric:         Behavior: Behavior normal.         Assessment & Plan   Diagnoses and all orders for this visit:    1. Primary hypertension (Primary)    2. Hypercholesterolemia    3. Adjustment insomnia    4. Anxiety and depression    5. Screening for colon cancer  -     Cologuard - Stool, Per Rectum; Future    6. Need for influenza vaccination  -     Fluzone High-Dose 65+yrs    Other orders  -     traZODone (DESYREL) 50 MG tablet; Take 1 tablet by mouth Every Night.  Dispense: 30 tablet; Refill: 2  -     lisinopril (PRINIVIL,ZESTRIL) 10 MG tablet; Take 1 tablet by mouth Daily.  Dispense: 90 tablet; Refill: 1  -     DULoxetine (CYMBALTA) 60 MG capsule; Take 1 capsule by mouth 2 (Two) Times a Day.  Dispense: 180 capsule; Refill: 1  -     atorvastatin (LIPITOR) 80 MG tablet; Take 1 tablet by mouth Every Night.  Dispense: 90 tablet; Refill: 1        Anxiety/Arthritis- continue current treatment.  Follow-up in 6 months.    Insomnia-I advised patient to go to bed 2 hours later, ~10.00PM.  If it does not help she will start trazodone.  We are prescribing 50 mg to be taken as needed at bedtime.  Follow-up in 6 months.    Hypertension-continue current treatment with follow-up in 6 months.    Patient would like to continue screening for colon cancer.  We are ordering Cologuard.          BMI is within normal parameters. No other follow-up for BMI required.

## 2024-12-17 RX ORDER — ATORVASTATIN CALCIUM 80 MG/1
80 TABLET, FILM COATED ORAL NIGHTLY
Qty: 90 TABLET | Refills: 1 | Status: SHIPPED | OUTPATIENT
Start: 2024-12-17

## 2024-12-17 NOTE — TELEPHONE ENCOUNTER
Caller: Joanna Ferris    Relationship: Self    Best call back number: [phone]     Requested Prescriptions:   Requested Prescriptions     Pending Prescriptions Disp Refills    atorvastatin (LIPITOR) 80 MG tablet 90 tablet 1     Sig: Take 1 tablet by mouth Every Night.        Pharmacy where request should be sent: [facility] #[id number] [place], [place] - [address] AT [facility] - [phone]  - [phone] FX     Last office visit with prescribing clinician: 10/23/2024   Last telemedicine visit with prescribing clinician: Visit date not found   Next office visit with prescribing clinician: 4/23/2025     Additional details provided by patient:      Does the patient have less than a 3 day supply:  [x] Yes  [] No    Would you like a call back once the refill request has been completed: [x] Yes [] No    If the office needs to give you a call back, can they leave a voicemail: [x] Yes [] No    Melissa Mo Rep   12/17/24 15:16 EST

## 2025-01-13 RX ORDER — MONTELUKAST SODIUM 10 MG/1
10 TABLET ORAL NIGHTLY
Qty: 90 TABLET | Refills: 1 | Status: SHIPPED | OUTPATIENT
Start: 2025-01-13

## 2025-01-15 RX ORDER — DULOXETIN HYDROCHLORIDE 60 MG/1
60 CAPSULE, DELAYED RELEASE ORAL 2 TIMES DAILY
Qty: 180 CAPSULE | Refills: 1 | Status: SHIPPED | OUTPATIENT
Start: 2025-01-15

## 2025-01-15 NOTE — TELEPHONE ENCOUNTER
Caller: Joanna Ferris    Relationship: Self    Best call back number: [phone]     Requested Prescriptions:   Requested Prescriptions     Pending Prescriptions Disp Refills    DULoxetine (CYMBALTA) 60 MG capsule 180 capsule 1     Sig: Take 1 capsule by mouth 2 (Two) Times a Day.        Pharmacy where request should be sent: [facility] #[id number] [place] [id number] [facility] - [phone]  - [phone] FX     Last office visit with prescribing clinician: 10/23/2024   Last telemedicine visit with prescribing clinician: Visit date not found   Next office visit with prescribing clinician: 4/23/2025     Additional details provided by patient:     Does the patient have less than a 3 day supply:  [x] Yes  [] No    Would you like a call back once the refill request has been completed: [] Yes [x] No    If the office needs to give you a call back, can they leave a voicemail: [x] Yes [] No    Melissa Nelson Rep   01/15/25 11:50 EST

## 2025-03-04 ENCOUNTER — TELEPHONE (OUTPATIENT)
Dept: INTERNAL MEDICINE | Facility: CLINIC | Age: 85
End: 2025-03-04
Payer: MEDICARE

## 2025-03-04 NOTE — TELEPHONE ENCOUNTER
Caller: Joanna Ferris    Relationship: Self    Best call back number: [phone]     What is the best time to reach you: ANY    Who are you requesting to speak with (clinical staff, provider,  specific staff member): CLINICAL STAFF     Do you know the name of the person who called: JOANNA    What was the call regarding: PATIENT IS REQUESTING A CALL REGARDING  A SHOT FOR HER  ARTHRITIS HAND PAIN . PLEASE CALL TO DISCUSS.     Is it okay if the provider responds through MyChart: YES

## 2025-03-19 ENCOUNTER — OFFICE VISIT (OUTPATIENT)
Dept: INTERNAL MEDICINE | Facility: CLINIC | Age: 85
End: 2025-03-19
Payer: MEDICARE

## 2025-03-19 VITALS
SYSTOLIC BLOOD PRESSURE: 132 MMHG | DIASTOLIC BLOOD PRESSURE: 70 MMHG | HEART RATE: 69 BPM | HEIGHT: 65 IN | WEIGHT: 153.1 LBS | OXYGEN SATURATION: 96 % | BODY MASS INDEX: 25.51 KG/M2 | TEMPERATURE: 97.4 F

## 2025-03-19 DIAGNOSIS — R25.1 TREMOR: Primary | ICD-10-CM

## 2025-03-19 DIAGNOSIS — M54.2 NECK PAIN: ICD-10-CM

## 2025-03-19 DIAGNOSIS — M25.511 ACUTE PAIN OF RIGHT SHOULDER: ICD-10-CM

## 2025-03-19 PROCEDURE — 1159F MED LIST DOCD IN RCRD: CPT | Performed by: FAMILY MEDICINE

## 2025-03-19 PROCEDURE — 1125F AMNT PAIN NOTED PAIN PRSNT: CPT | Performed by: FAMILY MEDICINE

## 2025-03-19 PROCEDURE — 3078F DIAST BP <80 MM HG: CPT | Performed by: FAMILY MEDICINE

## 2025-03-19 PROCEDURE — 3075F SYST BP GE 130 - 139MM HG: CPT | Performed by: FAMILY MEDICINE

## 2025-03-19 PROCEDURE — 99213 OFFICE O/P EST LOW 20 MIN: CPT | Performed by: FAMILY MEDICINE

## 2025-03-19 PROCEDURE — 1160F RVW MEDS BY RX/DR IN RCRD: CPT | Performed by: FAMILY MEDICINE

## 2025-03-19 NOTE — PROGRESS NOTES
Subjective   Joanna Ferris is a 84 y.o. female.   Chief Complaint   Patient presents with    Neck Pain     Pt c/o rt side neck and shoulder pain and needing referral to message therapy.    Shoulder Pain           Tremors     Pt want the referral to neurology.       History of Present Illness     Neck pain radiating to right shoulder-patient has chronic neck pain.  We did an x-ray a year ago.  It was positive for advanced arthritis.  She completed physical therapy and it helped.    She is here today because 4 days ago she started having neck pain radiating to right shoulder.  There was no known injury.  She is not sure how it started.  Pain is on and off, more on then off, right-sided.  Dull, intensity 8 out of 10.  Worse when turning head to the right.  There is no numbness or tingling in right hand, no muscle weakness.  She used massage and it helped some.    Tremors-she started noticing them about a year ago.  She has resting tremor in right hand more then inleft hand.  She noticed that she walks smaller steps when she is tired.  Her hand writing is getting smaller.  Family history positive for Parkinson disease in her father.    Review of Systems   Constitutional:  Positive for fatigue.   Musculoskeletal:  Positive for neck pain.   Neurological:  Positive for tremors. Negative for weakness and numbness.         Objective   Wt Readings from Last 3 Encounters:   03/19/25 69.4 kg (153 lb 1.6 oz)   10/23/24 67 kg (147 lb 9.6 oz)   04/17/24 66.2 kg (146 lb)      Vitals:    03/19/25 1510   BP: 132/70   Pulse: 69   Temp: 97.4 °F (36.3 °C)   SpO2: 96%     Temp Readings from Last 3 Encounters:   03/19/25 97.4 °F (36.3 °C) (Oral)   04/17/24 98.2 °F (36.8 °C)   01/17/24 97.3 °F (36.3 °C)     BP Readings from Last 3 Encounters:   03/19/25 132/70   10/23/24 110/64   04/17/24 122/68     Pulse Readings from Last 3 Encounters:   03/19/25 69   10/23/24 70   04/17/24 74     Body mass index is 25.48 kg/m².    Physical  Exam  Cardiovascular:      Rate and Rhythm: Normal rate and regular rhythm.   Pulmonary:      Effort: Pulmonary effort is normal.      Breath sounds: Normal breath sounds.   Musculoskeletal:      Comments: TTP over C-spine or paraspinal muscles.  No TTP over shoulders.  Limited rotation and extension in the neck.  Limited internal rotation in both shoulders.  Limited extension in right shoulder.  Muscle strength 5/5 BL x UE.     Neurological:      Mental Status: She is alert.      Comments: Rest tremors in right hand.         Assessment & Plan   Diagnoses and all orders for this visit:    1. Tremor (Primary)  -     Ambulatory Referral to Neurology    2. Neck pain  -     Ambulatory Referral to Physical Therapy for Evaluation & Treatment    3. Acute pain of right shoulder  -     Ambulatory Referral to Physical Therapy for Evaluation & Treatment        Neck pain radiating to right shoulder-patient will apply Voltaren gel. I am referring her to physical therapy.  Return to clinic if symptoms are not improved with treatment, sooner if worsening.    Tremors-referral to neurologist.

## 2025-04-15 DIAGNOSIS — E03.9 ACQUIRED HYPOTHYROIDISM: ICD-10-CM

## 2025-04-15 DIAGNOSIS — E78.00 HYPERCHOLESTEROLEMIA: Primary | ICD-10-CM

## 2025-04-17 LAB
ALBUMIN SERPL-MCNC: 4.5 G/DL (ref 3.7–4.7)
ALP SERPL-CCNC: 72 IU/L (ref 44–121)
ALT SERPL-CCNC: 27 IU/L (ref 0–32)
AST SERPL-CCNC: 32 IU/L (ref 0–40)
BILIRUB SERPL-MCNC: 0.9 MG/DL (ref 0–1.2)
BUN SERPL-MCNC: 20 MG/DL (ref 8–27)
BUN/CREAT SERPL: 17 (ref 12–28)
CALCIUM SERPL-MCNC: 11 MG/DL (ref 8.7–10.3)
CHLORIDE SERPL-SCNC: 98 MMOL/L (ref 96–106)
CHOLEST SERPL-MCNC: 186 MG/DL (ref 100–199)
CO2 SERPL-SCNC: 26 MMOL/L (ref 20–29)
CREAT SERPL-MCNC: 1.16 MG/DL (ref 0.57–1)
EGFRCR SERPLBLD CKD-EPI 2021: 47 ML/MIN/1.73
GLOBULIN SER CALC-MCNC: 2.1 G/DL (ref 1.5–4.5)
GLUCOSE SERPL-MCNC: 104 MG/DL (ref 70–99)
HDLC SERPL-MCNC: 87 MG/DL
LDLC SERPL CALC-MCNC: 83 MG/DL (ref 0–99)
LDLC/HDLC SERPL: 1 RATIO (ref 0–3.2)
POTASSIUM SERPL-SCNC: 4.7 MMOL/L (ref 3.5–5.2)
PROT SERPL-MCNC: 6.6 G/DL (ref 6–8.5)
SODIUM SERPL-SCNC: 138 MMOL/L (ref 134–144)
TRIGL SERPL-MCNC: 90 MG/DL (ref 0–149)
TSH SERPL DL<=0.005 MIU/L-ACNC: 3.33 UIU/ML (ref 0.45–4.5)
VLDLC SERPL CALC-MCNC: 16 MG/DL (ref 5–40)

## 2025-04-22 ENCOUNTER — PATIENT ROUNDING (BHMG ONLY) (OUTPATIENT)
Dept: NEUROLOGY | Facility: CLINIC | Age: 85
End: 2025-04-22
Payer: MEDICARE

## 2025-04-22 ENCOUNTER — OFFICE VISIT (OUTPATIENT)
Dept: NEUROLOGY | Facility: CLINIC | Age: 85
End: 2025-04-22
Payer: MEDICARE

## 2025-04-22 VITALS
OXYGEN SATURATION: 98 % | WEIGHT: 152 LBS | HEIGHT: 65 IN | HEART RATE: 63 BPM | DIASTOLIC BLOOD PRESSURE: 84 MMHG | BODY MASS INDEX: 25.33 KG/M2 | SYSTOLIC BLOOD PRESSURE: 132 MMHG

## 2025-04-22 DIAGNOSIS — G20.A1 PARKINSON'S DISEASE WITHOUT DYSKINESIA, UNSPECIFIED WHETHER MANIFESTATIONS FLUCTUATE: Primary | ICD-10-CM

## 2025-04-22 PROCEDURE — 1160F RVW MEDS BY RX/DR IN RCRD: CPT | Performed by: NURSE PRACTITIONER

## 2025-04-22 PROCEDURE — 1159F MED LIST DOCD IN RCRD: CPT | Performed by: NURSE PRACTITIONER

## 2025-04-22 PROCEDURE — 3075F SYST BP GE 130 - 139MM HG: CPT | Performed by: NURSE PRACTITIONER

## 2025-04-22 PROCEDURE — 99204 OFFICE O/P NEW MOD 45 MIN: CPT | Performed by: NURSE PRACTITIONER

## 2025-04-22 PROCEDURE — 3079F DIAST BP 80-89 MM HG: CPT | Performed by: NURSE PRACTITIONER

## 2025-04-22 RX ORDER — CARBIDOPA AND LEVODOPA 25; 100 MG/1; MG/1
1 TABLET ORAL 3 TIMES DAILY
Qty: 90 TABLET | Refills: 2 | Status: SHIPPED | OUTPATIENT
Start: 2025-04-22 | End: 2025-07-21

## 2025-04-22 NOTE — PROGRESS NOTES
[facility] NEUROLOGY         Date of Visit: 2025    Name: Joanna Ferris    :  1940    PCP: Ekaterina Santamaria MD    Visit Type: an initial evaluation  Chief Complaint   Patient presents with    Tremors             Subjective     Patient ID: Joanna is a 84 y.o. female.         History of Present Illness  History of seeing your patient for the first time today.  As you may know she is an 84-year-old female here today for initial evaluation for concerns for tremor.  She was referred by her primary care physician.    History:    Patient does have history of hypertension, hyperlipidemia, hypothyroid, vitamin D deficiency, chronic kidney disease stage III, depression, anxiety, osteoporosis.    Patient states that she is here today for evaluation for tremor that she has.  She states that she has had issues with her hands for several years been told that this was mostly arthritic.  However she was seeing her doctor for her arthritis who noticed a tremor and recommended that she be evaluated for possible tremor disorder.    Patient states that her symptoms have been going on for several years however slowly progressed and worsen.  Symptoms started primarily in the right upper extremity and have progressed to the left upper extremity with occasional symptoms in the lower extremities.    Patient states that she does have a family history of both father and his grandfather who both had Parkinson's disease.  No other known tremor history.  She has not had any recent head injuries.  She has not started any new medications.  She has never had chemotherapy or radiation.  She has not had any imaging of the brain.    Current:    Patient states that currently symptoms include a tremor noticed in her hands.  This is noticed mostly at rest however somewhat with intention type activities.  It is greater in the right hand than in the left head and she does also feel a tremor in her core.  In addition to this  she has noticed changes in her walking including a shuffling motion of her feet, decreased arm swing, and generalized slowness to her movements.  She does report generalized muscle stiffness.  She is also reported some issues with constipation which she controls with MiraLAX.  She does have some mild difficulty with swallowing on occasion.  She drools at nighttime.  She does not have any vivid dreams or hallucinations.  She has not noticed any significant cognitive changes.  She denies dizziness or lightheadedness.  No falls.  No syncope.  No vision changes.  No other new neurological complaints at today's visit.          The following portions of the patient's history were reviewed and updated as appropriate: allergies, current medications, past family history, past medical history, past social history, past surgical history, and problem list.                 Review of Systems   Constitutional:  Positive for activity change and fatigue. Negative for appetite change and unexpected weight change.   HENT:  Positive for drooling and trouble swallowing. Negative for dental problem, hearing loss, tinnitus and voice change.    Eyes:  Negative for visual disturbance.   Gastrointestinal:  Positive for constipation. Negative for diarrhea, nausea and vomiting.   Genitourinary:  Negative for difficulty urinating, frequency and urgency.   Musculoskeletal:  Positive for back pain, gait problem and neck pain.   Neurological:  Positive for tremors. Negative for dizziness, seizures, facial asymmetry, speech difficulty, weakness, light-headedness, numbness and headaches.   Psychiatric/Behavioral:  Negative for agitation, behavioral problems, confusion, hallucinations and sleep disturbance.             Current Medications:    Current Outpatient Medications   Medication Instructions    atorvastatin (LIPITOR) 80 mg, Oral, Nightly    azelastine (ASTELIN) 0.1 % nasal spray 2 sprays, Nasal, 2 Times Daily, Use in each nostril as directed  "   Calcium Citrate-Vitamin D (CALCIUM + D PO) Daily    carbidopa-levodopa (SINEMET)  MG per tablet 1 tablet, Oral, 3 Times Daily    cycloSPORINE (RESTASIS) 0.05 % ophthalmic emulsion PLACE 1 DROP IN BOTH EYES TWICE DAILY    diclofenac (VOLTAREN) 0.1 % ophthalmic solution INSTILL 1 DROP IN LEFT EYE TWICE DAILY    DULoxetine (CYMBALTA) 60 mg, Oral, 2 Times Daily    fish oil 1,200 mg, Daily    fluticasone (FLONASE) 50 MCG/ACT nasal spray 2 sprays, Each Nare, Daily    levothyroxine (SYNTHROID, LEVOTHROID) 50 mcg, Oral, Daily    lisinopril (PRINIVIL,ZESTRIL) 10 mg, Oral, Daily    montelukast (SINGULAIR) 10 mg, Oral, Nightly    traZODone (DESYREL) 50 mg, Oral, Nightly    Vitamin D3 5,000 Units, Daily          /84   Pulse 63   Ht 165.1 cm (65\")   Wt 68.9 kg (152 lb)   SpO2 98%   BMI 25.29 kg/m²                Objective     Neurological Exam  Mental Status  Awake, alert and oriented to person, place and time. Speech is normal. Language is fluent with no aphasia.    Cranial Nerves  CN II: Visual fields full to confrontation.  CN III, IV, VI: Extraocular movements intact bilaterally. Normal lids and orbits bilaterally. Pupils equal round and reactive to light bilaterally.  CN V: Facial sensation is normal.  CN VII: Full and symmetric facial movement.  CN IX, X: Palate elevates symmetrically  CN XI: Shoulder shrug strength is normal.  CN XII: Tongue midline without atrophy or fasciculations.    Motor  Normal muscle bulk throughout. Increased muscle tone. Bilateral cogwheeling rigidity right slightly greater than left in the upper extremities cogwheeling in nature mild to moderate in severity. The following abnormal movements were seen: Mild pill-rolling resting tremor right upper extremity    Moderate bradykinesia with finger tapping and generalized bradykinesia to movement..   Strength is 5/5 throughout all four extremities.    Sensory  Sensation is intact to light touch, pinprick, vibration and " proprioception in all four extremities.    Reflexes  Deep tendon reflexes are 2+ and symmetric except as noted.                                            Right                      Left  Brachioradialis                    3+                          Biceps                                 3+                          Patellar                                3+                          Achilles                                3+                            Coordination  Right: Finger-to-nose normal. Rapid alternating movement abnormality:Left: Finger-to-nose normal. Rapid alternating movement abnormality:    Gait  Casual gait: Narrow stance. Reduced stride length. Shuffling and festinating gait. Reduced right arm swing. Reduced left arm swing. Unable to rise from chair without using arms.      Physical Exam  Constitutional:       Appearance: Normal appearance. She is normal weight.   Eyes:      General: Lids are normal.      Extraocular Movements: Extraocular movements intact.      Pupils: Pupils are equal, round, and reactive to light.   Pulmonary:      Effort: Pulmonary effort is normal.   Skin:     General: Skin is warm.   Neurological:      Motor: Motor strength is normal.     Deep Tendon Reflexes:      Reflex Scores:       Bicep reflexes are 3+ on the right side.       Brachioradialis reflexes are 3+ on the right side.       Patellar reflexes are 3+ on the right side.       Achilles reflexes are 3+ on the right side.  Psychiatric:         Mood and Affect: Mood normal.         Speech: Speech normal.         Behavior: Behavior normal.                     Assessment & Plan     Diagnoses and all orders for this visit:    1. Parkinson's disease without dyskinesia, unspecified whether manifestations fluctuate (Primary)  -     carbidopa-levodopa (SINEMET)  MG per tablet; Take 1 tablet by mouth 3 (Three) Times a Day for 90 days.  Dispense: 90 tablet; Refill: 2  -     MRI Brain With & Without Contrast; Future       At  this time patient's exam is consistent with diagnostic Parkinson's disease.  She has bradykinesia, muscle rigidity, and resting tremor as well as secondary symptoms including constipation, drooling, difficulty swallowing.    Because of this I would like to go ahead and start patient on carbidopa levodopa 25/100 taking 1 tablet by mouth 3 times daily for treatment of Parkinson symptoms.    We did discuss taking medication 6 hours apart due to medication wearing off after about 6 hours.  We also discussed potential side effect complaints with the medication including insomnia, vivid dreams, dyskinesia, stomach upset, constipation.    Would also like to go ahead and obtain baseline MRI of the brain.    We will plan for a follow-up appointment in 1 month.            Valerie ROSAS    Neurology    [facility] [place]    Phone: [phone]    4/22/2025 , 13:39 EDT

## 2025-04-30 ENCOUNTER — OFFICE VISIT (OUTPATIENT)
Dept: INTERNAL MEDICINE | Facility: CLINIC | Age: 85
End: 2025-04-30
Payer: MEDICARE

## 2025-04-30 VITALS
DIASTOLIC BLOOD PRESSURE: 60 MMHG | BODY MASS INDEX: 24.99 KG/M2 | WEIGHT: 150 LBS | HEIGHT: 65 IN | TEMPERATURE: 97.3 F | SYSTOLIC BLOOD PRESSURE: 100 MMHG | HEART RATE: 75 BPM

## 2025-04-30 DIAGNOSIS — F32.A ANXIETY AND DEPRESSION: ICD-10-CM

## 2025-04-30 DIAGNOSIS — I10 PRIMARY HYPERTENSION: Primary | ICD-10-CM

## 2025-04-30 DIAGNOSIS — E03.9 ACQUIRED HYPOTHYROIDISM: ICD-10-CM

## 2025-04-30 DIAGNOSIS — E78.00 HYPERCHOLESTEROLEMIA: ICD-10-CM

## 2025-04-30 DIAGNOSIS — E83.52 HYPERCALCEMIA: ICD-10-CM

## 2025-04-30 DIAGNOSIS — Z12.11 ENCOUNTER FOR SCREENING FOR MALIGNANT NEOPLASM OF COLON: ICD-10-CM

## 2025-04-30 DIAGNOSIS — F41.9 ANXIETY AND DEPRESSION: ICD-10-CM

## 2025-04-30 RX ORDER — DULOXETIN HYDROCHLORIDE 60 MG/1
60 CAPSULE, DELAYED RELEASE ORAL 2 TIMES DAILY
Qty: 180 CAPSULE | Refills: 1 | Status: SHIPPED | OUTPATIENT
Start: 2025-04-30

## 2025-04-30 RX ORDER — MONTELUKAST SODIUM 10 MG/1
10 TABLET ORAL NIGHTLY
Qty: 90 TABLET | Refills: 1 | Status: SHIPPED | OUTPATIENT
Start: 2025-04-30

## 2025-04-30 RX ORDER — LISINOPRIL 10 MG/1
10 TABLET ORAL DAILY
Qty: 90 TABLET | Refills: 1 | Status: SHIPPED | OUTPATIENT
Start: 2025-04-30

## 2025-04-30 RX ORDER — ATORVASTATIN CALCIUM 80 MG/1
80 TABLET, FILM COATED ORAL NIGHTLY
Qty: 90 TABLET | Refills: 1 | Status: SHIPPED | OUTPATIENT
Start: 2025-04-30

## 2025-04-30 RX ORDER — LEVOTHYROXINE SODIUM 50 UG/1
50 TABLET ORAL DAILY
Qty: 90 TABLET | Refills: 3 | Status: SHIPPED | OUTPATIENT
Start: 2025-04-30

## 2025-04-30 NOTE — PROGRESS NOTES
Subjective   Joanna Ferris is a 84 y.o. female.   Chief Complaint   Patient presents with    Hypertension    Hyperlipidemia    Anxiety    Hypothyroidism       History of Present Illness     Anxiety/arthritis-  it is localized in hands and knees. On Cymbalta 60 mg twice a day-it helps with arthritis and anxiety.  It was started in 2015.  She tried prior to that other medications but none of them worked as well as Cymbalta. She takes it every day.  She has no side effects.    Her mood is good most of the time, no excessive worries or irritability.  She sleeps better.  She does not use trazodone anymore.  PHQ 9 at 5 from  6 from 16 from 12 JUAN-7 at 5 from 0 from 6 from 8.     Hypertension/palpitations -on lisinopril 10 mg a day. She takes it everyday.  She has no side effects.  She has no chest pain, no shortness of breath, no lightheadedness, no palpitations.  Creatinine 1.16 from 0.9 from 1.14, GFR 47 from 64 from 48.  She uses no NSAIDs.  No urinary symptoms.  No blood seen in urine.  Calcium at 11.0.  She takes supplemental calcium daily.      Hypercholesterolemia- on Lipitor 80 mg a day. She takes it everyday.  No side effects.  LDL 83 from 78 from 91, HDL  87 from 86 from 92.  LFTs normal.    Hypothyroidism- patient is on levothyroxine at 50 mcg a day.  She takes it every day.  She takes on empty stomach and does not eat for at least an hour after taking it.  TSH at  3.3.    After last office visit patient was evaluated by neurologist.  She was diagnosed with Parkinson disease.  She is scheduled for tomorrow.  She was started on Sinemet.  She reports no side effects.  She noticed improvement.    Review of Systems   Respiratory:  Negative for shortness of breath.    Cardiovascular:  Negative for chest pain and palpitations.   Musculoskeletal:  Negative for myalgias.   Neurological:  Negative for light-headedness.   Psychiatric/Behavioral:  Negative for suicidal ideas.          Objective   Wt Readings from Last 3  Encounters:   04/30/25 68 kg (150 lb)   04/22/25 68.9 kg (152 lb)   03/19/25 69.4 kg (153 lb 1.6 oz)      Vitals:    04/30/25 1300   BP: 100/60   Pulse: 75   Temp: 97.3 °F (36.3 °C)     Temp Readings from Last 3 Encounters:   04/30/25 97.3 °F (36.3 °C)   03/19/25 97.4 °F (36.3 °C) (Oral)   04/17/24 98.2 °F (36.8 °C)     BP Readings from Last 3 Encounters:   04/30/25 100/60   04/22/25 132/84   03/19/25 132/70     Pulse Readings from Last 3 Encounters:   04/30/25 75   04/22/25 63   03/19/25 69     Body mass index is 24.96 kg/m².    Physical Exam  Constitutional:       Appearance: She is well-developed.   Neck:      Vascular: No carotid bruit.   Cardiovascular:      Rate and Rhythm: Normal rate and regular rhythm.      Heart sounds: Normal heart sounds.   Pulmonary:      Effort: Pulmonary effort is normal.      Breath sounds: Normal breath sounds.   Skin:     General: Skin is warm and dry.   Neurological:      Mental Status: She is alert.   Psychiatric:         Behavior: Behavior normal.         Assessment & Plan   Diagnoses and all orders for this visit:    1. Primary hypertension (Primary)    2. Anxiety and depression    3. Encounter for screening for malignant neoplasm of colon  -     Cologuard - Stool, Per Rectum; Future    4. Hypercholesterolemia    5. Acquired hypothyroidism    6. Hypercalcemia    Other orders  -     atorvastatin (LIPITOR) 80 MG tablet; Take 1 tablet by mouth Every Night.  Dispense: 90 tablet; Refill: 1  -     DULoxetine (CYMBALTA) 60 MG capsule; Take 1 capsule by mouth 2 (Two) Times a Day.  Dispense: 180 capsule; Refill: 1  -     levothyroxine (SYNTHROID, LEVOTHROID) 50 MCG tablet; Take 1 tablet by mouth Daily.  Dispense: 90 tablet; Refill: 3  -     lisinopril (PRINIVIL,ZESTRIL) 10 MG tablet; Take 1 tablet by mouth Daily.  Dispense: 90 tablet; Refill: 1  -     montelukast (SINGULAIR) 10 MG tablet; Take 1 tablet by mouth Every Night.  Dispense: 90 tablet; Refill: 1        HTN - continue current  treatment.  Follow-up in 6 months.    Hyperlipidemia-continue current treatment.  Follow-up in 6 months.    Anxiety/depression/arthritis-continue current treatment.  Follow-up in 6 months.    Hypothyroidism-continue current treatment.  Follow-up in 12 months.    Hypercalcemia-hold supplemental calcium.  Will recheck it in 6 months.    Patient had Cologuard done in 2021.  We discussed recommendations about not screening for colon cancer above age of 80. Risk of falsely positive results discussed, it would warrant follow-up colonoscopy if positive.  Patient would like to have Cologuard ordered 1 more time.          BMI is within normal parameters. No other follow-up for BMI required.

## 2025-05-01 ENCOUNTER — HOSPITAL ENCOUNTER (OUTPATIENT)
Dept: MRI IMAGING | Facility: HOSPITAL | Age: 85
Discharge: HOME | End: 2025-05-01
Admitting: NURSE PRACTITIONER
Payer: MEDICARE

## 2025-05-01 DIAGNOSIS — G20.A1 PARKINSON'S DISEASE WITHOUT DYSKINESIA, UNSPECIFIED WHETHER MANIFESTATIONS FLUCTUATE: ICD-10-CM

## 2025-05-01 PROCEDURE — A9577 INJ MULTIHANCE: HCPCS | Performed by: NURSE PRACTITIONER

## 2025-05-01 PROCEDURE — 70553 MRI BRAIN STEM W/O & W/DYE: CPT

## 2025-05-01 PROCEDURE — 25510000002 GADOBENATE DIMEGLUMINE 529 MG/ML SOLUTION: Performed by: NURSE PRACTITIONER

## 2025-05-01 RX ADMIN — GADOBENATE DIMEGLUMINE 13 ML: 529 INJECTION, SOLUTION INTRAVENOUS at 16:00

## 2025-05-22 ENCOUNTER — OFFICE VISIT (OUTPATIENT)
Dept: NEUROLOGY | Facility: CLINIC | Age: 85
End: 2025-05-22
Payer: MEDICARE

## 2025-05-22 VITALS
OXYGEN SATURATION: 97 % | SYSTOLIC BLOOD PRESSURE: 128 MMHG | HEIGHT: 65 IN | DIASTOLIC BLOOD PRESSURE: 82 MMHG | BODY MASS INDEX: 24.49 KG/M2 | WEIGHT: 147 LBS | HEART RATE: 73 BPM

## 2025-05-22 DIAGNOSIS — G20.A1 PARKINSON'S DISEASE WITHOUT DYSKINESIA, UNSPECIFIED WHETHER MANIFESTATIONS FLUCTUATE: Primary | ICD-10-CM

## 2025-05-22 RX ORDER — CARBIDOPA AND LEVODOPA 25; 100 MG/1; MG/1
2 TABLET ORAL 3 TIMES DAILY
Qty: 180 TABLET | Refills: 2 | Status: SHIPPED | OUTPATIENT
Start: 2025-05-22 | End: 2025-08-20

## 2025-05-23 NOTE — PROGRESS NOTES
[facility] NEUROLOGY         Date of Visit: 2025    Name: Joanna Ferris    :  1940    PCP: Ekaterina Santamaria MD    Visit Type: follow-up  Chief Complaint   Patient presents with    Parkinson's Disease             Subjective     Patient ID: Joanna is a 84 y.o. female.         History of Present Illness  The pleasure of seeing your patient today.  As you may know she is an 84-year-old female here today for i follow-up for Parkinson's.  She was referred by her primary care physician.    History:    Patient does have history of hypertension, hyperlipidemia, hypothyroid, vitamin D deficiency, chronic kidney disease stage III, depression, anxiety, osteoporosis.    Patient states that she is here today for evaluation for tremor that she has.  She states that she has had issues with her hands for several years been told that this was mostly arthritic.  However she was seeing her doctor for her arthritis who noticed a tremor and recommended that she be evaluated for possible tremor disorder.    Patient states that her symptoms have been going on for several years however slowly progressed and worsen.  Symptoms started primarily in the right upper extremity and have progressed to the left upper extremity with occasional symptoms in the lower extremities.    Patient states that she does have a family history of both father and his grandfather who both had Parkinson's disease.  No other known tremor history.  She has not had any recent head injuries.  She has not started any new medications.  She has never had chemotherapy or radiation.  She has not had any imaging of the brain.    Current:    At last appointment we did end up diagnosing patient with Parkinson's disease due to bradykinesia, muscle rigidity, and resting tremor noted on examination.  We did end up starting patient on carbidopa levodopa 25/100 taking 1 tablet by mouth 3 times daily and ordered a baseline MRI brain with and without  contrast to look for any abnormalities that could mimic Parkinson's disease.    Patient's MRI of the brain came back mostly within normal limits with some mild microvascular changes with no other significant abnormalities.    Patient states that overall she has noticed a significant improvement in her overall symptoms and wellbeing since starting on the carbidopa.  So far she has had no side effect complaints.  She is taking the medicine 3 times daily 6 hours apart.  She states that it has improved her ability to move, decreased her tremor, and help with her overall energy levels.  She states that she is not noticing wear off between doses.  She does does have some mild resting tremor in the left lower extremity and some mild stiffness of movement however feels much improved overall since when she started the medication.  She denies any other new or worsening neurologic symptoms at today's visit.        The following portions of the patient's history were reviewed and updated as appropriate: allergies, current medications, past family history, past medical history, past social history, past surgical history, and problem list.                 Review of Systems   Constitutional:  Negative for activity change, appetite change and unexpected weight change.   HENT:  Negative for dental problem, drooling, hearing loss, tinnitus, trouble swallowing and voice change.    Eyes:  Negative for visual disturbance.   Gastrointestinal:  Positive for constipation. Negative for diarrhea.   Genitourinary:  Negative for difficulty urinating, frequency and urgency.   Musculoskeletal:  Positive for back pain and gait problem.   Neurological:  Positive for tremors. Negative for dizziness, seizures, facial asymmetry, speech difficulty and light-headedness.   Psychiatric/Behavioral:  Negative for agitation, behavioral problems, confusion, hallucinations and sleep disturbance.             Current Medications:    Current Outpatient  "Medications   Medication Instructions    atorvastatin (LIPITOR) 80 mg, Oral, Nightly    azelastine (ASTELIN) 0.1 % nasal spray 2 sprays, Nasal, 2 Times Daily, Use in each nostril as directed    carbidopa-levodopa (SINEMET)  MG per tablet 2 tablets, Oral, 3 Times Daily    cycloSPORINE (RESTASIS) 0.05 % ophthalmic emulsion PLACE 1 DROP IN BOTH EYES TWICE DAILY    diclofenac (VOLTAREN) 0.1 % ophthalmic solution INSTILL 1 DROP IN LEFT EYE TWICE DAILY    DULoxetine (CYMBALTA) 60 mg, Oral, 2 Times Daily    fish oil 1,200 mg, Daily    fluticasone (FLONASE) 50 MCG/ACT nasal spray 2 sprays, Each Nare, Daily    levothyroxine (SYNTHROID, LEVOTHROID) 50 mcg, Oral, Daily    lisinopril (PRINIVIL,ZESTRIL) 10 mg, Oral, Daily    montelukast (SINGULAIR) 10 mg, Oral, Nightly          /82   Pulse 73   Ht 165.1 cm (65\")   Wt 66.7 kg (147 lb)   SpO2 97%   BMI 24.46 kg/m²                Objective     Neurological Exam  Mental Status  Awake, alert and oriented to person, place and time. Speech is normal. Language is fluent with no aphasia.    Cranial Nerves  CN II: Visual fields full to confrontation.  CN III, IV, VI: Extraocular movements intact bilaterally. Normal lids and orbits bilaterally. Pupils equal round and reactive to light bilaterally.  CN V: Facial sensation is normal.  CN VII: Full and symmetric facial movement.  CN IX, X: Palate elevates symmetrically  CN XI: Shoulder shrug strength is normal.  CN XII: Tongue midline without atrophy or fasciculations.    Motor  Normal muscle bulk throughout. Increased muscle tone. Mild cogwheeling rigidity right upper extremity with no noticeable cogwheeling rigidity in the left upper extremity improved from previous examination. The following abnormal movements were seen: No resting tremor noted in the right upper extremity today.  There is a mild resting tremor in the left lower extremity.    Still some generalized bradykinesia with mild bradykinesia with finger tapping " right greater than left improved from previous exam.   Strength is 5/5 throughout all four extremities.    Reflexes  Deep tendon reflexes are 2+ and symmetric except as noted.                                            Right                      Left  Brachioradialis                    3+                          Biceps                                 3+                          Patellar                                3+                          Achilles                                3+                            Coordination  Right: Finger-to-nose normal. Rapid alternating movement abnormality:Left: Finger-to-nose normal. Rapid alternating movement abnormality:    Gait  Casual gait: Normal stance. Normal stride length. Normal gait. Reduced right arm swing. Normal left arm swing. Unable to rise from chair without using arms.  Some mild reduced arm swing in the right upper extremity with no stooped posture or shuffling gait movements.  Overall significantly improved from previous exam.      Physical Exam  Constitutional:       Appearance: Normal appearance. She is normal weight.   Eyes:      General: Lids are normal.      Extraocular Movements: Extraocular movements intact.      Pupils: Pupils are equal, round, and reactive to light.   Pulmonary:      Effort: Pulmonary effort is normal.   Skin:     General: Skin is warm.   Neurological:      Motor: Motor strength is normal.     Gait: Gait is intact.      Deep Tendon Reflexes:      Reflex Scores:       Bicep reflexes are 3+ on the right side.       Brachioradialis reflexes are 3+ on the right side.       Patellar reflexes are 3+ on the right side.       Achilles reflexes are 3+ on the right side.  Psychiatric:         Mood and Affect: Mood normal.         Speech: Speech normal.         Behavior: Behavior normal.                     Assessment & Plan     Diagnoses and all orders for this visit:    1. Parkinson's disease without dyskinesia, unspecified whether  manifestations fluctuate (Primary)  -     carbidopa-levodopa (SINEMET)  MG per tablet; Take 2 tablets by mouth 3 (Three) Times a Day for 90 days.  Dispense: 180 tablet; Refill: 2         At this time I would like to increase patient's carbidopa as she still has some muscle rigidity and bradykinesia on examination.  We will increase to 1-1/2 tablet 3 times daily and have patient reach out in a week.  If still symptomatic we may consider increase to 2 tablets 3 times daily.    Plan for follow-up in 4 weeks or sooner if needed.            Valerie ROSAS    Neurology    [facility] [place]    Phone: [phone]    5/22/2025 , 21:15 EDT

## 2025-05-29 ENCOUNTER — TELEPHONE (OUTPATIENT)
Dept: NEUROLOGY | Facility: CLINIC | Age: 85
End: 2025-05-29

## 2025-05-29 NOTE — TELEPHONE ENCOUNTER
PATIENT CALLING TO ADVISE, SHE HAS BEEN TAKING   carbidopa-levodopa (SINEMET)  MG per tablet     1.5 TABLETS AND IS DOING WELL.  SHE SAYS IF PROVIDER WANTS TO INCREASE TO 2 TABLETS SHE THINKS THAT WILL BE FINE.    PLEASE ADVISE PATIENT

## 2025-06-04 RX ORDER — LEVOTHYROXINE SODIUM 50 UG/1
50 TABLET ORAL DAILY
Qty: 90 TABLET | Refills: 3 | Status: SHIPPED | OUTPATIENT
Start: 2025-06-04

## 2025-06-27 ENCOUNTER — OFFICE VISIT (OUTPATIENT)
Dept: NEUROLOGY | Facility: CLINIC | Age: 85
End: 2025-06-27
Payer: MEDICARE

## 2025-06-27 VITALS
HEIGHT: 65 IN | WEIGHT: 146 LBS | SYSTOLIC BLOOD PRESSURE: 122 MMHG | OXYGEN SATURATION: 95 % | BODY MASS INDEX: 24.32 KG/M2 | DIASTOLIC BLOOD PRESSURE: 74 MMHG | HEART RATE: 72 BPM

## 2025-06-27 DIAGNOSIS — G20.A1 PARKINSON'S DISEASE WITHOUT DYSKINESIA, UNSPECIFIED WHETHER MANIFESTATIONS FLUCTUATE: ICD-10-CM

## 2025-06-27 DIAGNOSIS — K21.9 GASTROESOPHAGEAL REFLUX DISEASE WITHOUT ESOPHAGITIS: Primary | ICD-10-CM

## 2025-06-27 RX ORDER — OMEPRAZOLE 20 MG/1
20 CAPSULE, DELAYED RELEASE ORAL DAILY
Qty: 30 CAPSULE | Refills: 5 | Status: SHIPPED | OUTPATIENT
Start: 2025-06-27 | End: 2025-12-24

## 2025-06-27 RX ORDER — CARBIDOPA AND LEVODOPA 25; 100 MG/1; MG/1
2 TABLET ORAL 3 TIMES DAILY
Qty: 180 TABLET | Refills: 2 | Status: SHIPPED | OUTPATIENT
Start: 2025-06-27 | End: 2025-09-25

## 2025-07-30 RX ORDER — LEVOTHYROXINE SODIUM 50 UG/1
50 TABLET ORAL DAILY
Qty: 90 TABLET | Refills: 3 | Status: SHIPPED | OUTPATIENT
Start: 2025-07-30